# Patient Record
Sex: FEMALE | Race: WHITE | Employment: FULL TIME | URBAN - METROPOLITAN AREA
[De-identification: names, ages, dates, MRNs, and addresses within clinical notes are randomized per-mention and may not be internally consistent; named-entity substitution may affect disease eponyms.]

---

## 2020-07-25 ENCOUNTER — APPOINTMENT (OUTPATIENT)
Dept: GENERAL RADIOLOGY | Age: 50
End: 2020-07-25
Payer: COMMERCIAL

## 2020-07-25 ENCOUNTER — HOSPITAL ENCOUNTER (EMERGENCY)
Age: 50
Discharge: HOME | End: 2020-07-25
Attending: EMERGENCY MEDICINE
Payer: COMMERCIAL

## 2020-07-25 VITALS
WEIGHT: 145 LBS | TEMPERATURE: 99.3 F | RESPIRATION RATE: 16 BRPM | SYSTOLIC BLOOD PRESSURE: 116 MMHG | OXYGEN SATURATION: 96 % | BODY MASS INDEX: 25.69 KG/M2 | DIASTOLIC BLOOD PRESSURE: 84 MMHG | HEIGHT: 63 IN | HEART RATE: 71 BPM

## 2020-07-25 PROCEDURE — 96374 THER/PROPH/DIAG INJ IV PUSH: CPT

## 2020-07-25 PROCEDURE — 73610 X-RAY EXAM OF ANKLE: CPT

## 2020-07-25 PROCEDURE — 99283 EMERGENCY DEPT VISIT LOW MDM: CPT

## 2020-07-25 PROCEDURE — 6370000000 HC RX 637 (ALT 250 FOR IP): Performed by: NURSE PRACTITIONER

## 2020-07-25 PROCEDURE — 6360000002 HC RX W HCPCS: Performed by: NURSE PRACTITIONER

## 2020-07-25 PROCEDURE — 29515 APPLICATION SHORT LEG SPLINT: CPT

## 2020-07-25 PROCEDURE — 96375 TX/PRO/DX INJ NEW DRUG ADDON: CPT

## 2020-07-25 RX ORDER — MORPHINE SULFATE 4 MG/ML
4 INJECTION, SOLUTION INTRAMUSCULAR; INTRAVENOUS ONCE
Status: COMPLETED | OUTPATIENT
Start: 2020-07-25 | End: 2020-07-25

## 2020-07-25 RX ORDER — DIPHENHYDRAMINE HYDROCHLORIDE 50 MG/ML
12.5 INJECTION INTRAMUSCULAR; INTRAVENOUS ONCE
Status: COMPLETED | OUTPATIENT
Start: 2020-07-25 | End: 2020-07-25

## 2020-07-25 RX ORDER — OXYCODONE HYDROCHLORIDE AND ACETAMINOPHEN 5; 325 MG/1; MG/1
2 TABLET ORAL ONCE
Status: COMPLETED | OUTPATIENT
Start: 2020-07-25 | End: 2020-07-25

## 2020-07-25 RX ORDER — MELOXICAM 7.5 MG/1
7.5 TABLET ORAL DAILY
COMMUNITY

## 2020-07-25 RX ORDER — OXYCODONE HYDROCHLORIDE AND ACETAMINOPHEN 5; 325 MG/1; MG/1
1 TABLET ORAL EVERY 6 HOURS PRN
Qty: 10 TABLET | Refills: 0 | Status: SHIPPED | OUTPATIENT
Start: 2020-07-25 | End: 2020-07-28

## 2020-07-25 RX ORDER — ONDANSETRON 2 MG/ML
4 INJECTION INTRAMUSCULAR; INTRAVENOUS ONCE
Status: COMPLETED | OUTPATIENT
Start: 2020-07-25 | End: 2020-07-25

## 2020-07-25 RX ORDER — IBUPROFEN 200 MG
200 TABLET ORAL EVERY 6 HOURS PRN
Status: ON HOLD | COMMUNITY
End: 2020-08-04

## 2020-07-25 RX ADMIN — MORPHINE SULFATE 4 MG: 4 INJECTION INTRAVENOUS at 16:25

## 2020-07-25 RX ADMIN — ONDANSETRON HYDROCHLORIDE 4 MG: 2 INJECTION, SOLUTION INTRAMUSCULAR; INTRAVENOUS at 16:25

## 2020-07-25 RX ADMIN — OXYCODONE HYDROCHLORIDE AND ACETAMINOPHEN 2 TABLET: 5; 325 TABLET ORAL at 17:09

## 2020-07-25 RX ADMIN — DIPHENHYDRAMINE HYDROCHLORIDE 12.5 MG: 50 INJECTION, SOLUTION INTRAMUSCULAR; INTRAVENOUS at 16:59

## 2020-07-25 ASSESSMENT — PAIN SCALES - GENERAL
PAINLEVEL_OUTOF10: 5
PAINLEVEL_OUTOF10: 2

## 2020-07-25 ASSESSMENT — ENCOUNTER SYMPTOMS
SORE THROAT: 0
ABDOMINAL PAIN: 0
SHORTNESS OF BREATH: 0
RHINORRHEA: 0
COLOR CHANGE: 0

## 2020-07-25 ASSESSMENT — PAIN DESCRIPTION - LOCATION
LOCATION: ANKLE

## 2020-07-25 ASSESSMENT — PAIN DESCRIPTION - ORIENTATION
ORIENTATION: RIGHT

## 2020-07-25 NOTE — ED PROVIDER NOTES
I independently performed a history and physical on [facility]. All diagnostic, treatment, and disposition decisions were made by myself in conjunction with the advanced practice provider. For further details of [facility] emergency department encounter, please see Devora Hernandez NP's documentation. Patient reports that she accidentally stepped in a hole while stepping off of her deck and injured her ankle. She had sudden onset of severe pain and was unable to ambulate. On exam the right ankle is swollen and she is neurovascular intact to the toes with 2+ DP pulse. No open component. X-ray shows trimalleolar fracture. Does not appear to need reduction in my opinion with no dislocation. Xr Ankle Right (min 3 Views)    Result Date: 7/25/2020  EXAMINATION: THREE XRAY VIEWS OF THE RIGHT ANKLE 7/25/2020 4:13 pm COMPARISON: None. HISTORY: ORDERING SYSTEM PROVIDED HISTORY: fall TECHNOLOGIST PROVIDED HISTORY: Reason for exam:->fall Reason for Exam: stepped in a hole Acuity: Acute Type of Exam: Initial FINDINGS: Trimalleolar fracture. The ankle mortise maintains approximately near normal alignment. The talar dome is intact. There is associated radiographic soft tissue swelling. Bony mineralization is within normal limits. Trimalleolar fracture with largely preserved ankle mortise.         Hayder Chery MD  07/25/20 9603

## 2020-07-25 NOTE — ED PROVIDER NOTES
Evaluated by Advanced Practice Provider          [facility] ED  [place]        Pt Name: Reena Goss  MRN: [id number]  [place] 1970  Dateof evaluation: 7/25/2020  Provider: LULU Mcwilliams - CNP  PCP: Suri Celestin MD  ED Attending: No att. providers found    [address] [place] [address]       Chief Complaint   Patient presents with    Ankle Pain     stepped in hole c/o right ankle pain and swelling       HISTORY OF PRESENTILLNESS   (Location/Symptom, Timing/Onset, Context/Setting, Quality, Duration, Modifying Factors, Severity)  Note limiting factors. Reena Goss is a 47 y.o. female for right ankle pain. Onset was today. Context includes since the onset. Alleviating factors include pt states she twisted her right ankle in a hole while walking . Aggravating factors include nothing. Pain is 5/10. nothing has been used for pain today. Nursing Notes were all reviewed and agreed with or any disagreements were addressed  in the HPI. REVIEW OF SYSTEMS    (2-9 systems for level 4, 10 or more for level 5)     Review of Systems   Constitutional: Negative for fever. HENT: Negative for congestion, rhinorrhea and sore throat. Respiratory: Negative for shortness of breath. Cardiovascular: Negative for chest pain. Gastrointestinal: Negative for abdominal pain. Genitourinary: Negative for decreased urine volume and difficulty urinating. Musculoskeletal: Negative for arthralgias and myalgias. Right ankle pain    Skin: Negative for color change and rash. Neurological: Negative for dizziness and light-headedness. Psychiatric/Behavioral: Negative for agitation. All other systems reviewed and are negative. Positives and Pertinent negatives as per HPI. Except as noted above in the ROS, all other systems were reviewed and negative.        PAST MEDICAL HISTORY     Past Medical History:   Diagnosis Date    Anxiety     Headache     Thyroid disease          SURGICAL HISTORY     History reviewed. No pertinent surgical history. CURRENT MEDICATIONS       Previous Medications    FLUOXETINE HCL (PROZAC PO)    Take by mouth    IBUPROFEN (ADVIL;MOTRIN) 200 MG TABLET    Take 200 mg by mouth every 6 hours as needed for Pain    LEVOTHYROXINE SODIUM (LEVOTHROID PO)    Take by mouth    MELOXICAM (MOBIC) 7.5 MG TABLET    Take 7.5 mg by mouth daily         ALLERGIES     Patient has no known allergies. FAMILY HISTORY     History reviewed. No pertinent family history.        SOCIAL HISTORY       Social History     Socioeconomic History    Marital status:      Spouse name: None    Number of children: None    Years of education: None    Highest education level: None   Occupational History    None   Social Needs    Financial resource strain: None    Food insecurity     Worry: None     Inability: None    Transportation needs     Medical: None     Non-medical: None   Tobacco Use    Smoking status: Never Smoker    Smokeless tobacco: Never Used   Substance and Sexual Activity    Alcohol use: Yes     Comment: occ    Drug use: Not Currently    Sexual activity: None   Lifestyle    Physical activity     Days per week: None     Minutes per session: None    Stress: None   Relationships    Social connections     Talks on phone: None     Gets together: None     Attends Protestant service: None     Active member of club or organization: None     Attends meetings of clubs or organizations: None     Relationship status: None    Intimate partner violence     Fear of current or ex partner: None     Emotionally abused: None     Physically abused: None     Forced sexual activity: None   Other Topics Concern    None   Social History Narrative    None       SCREENINGS             PHYSICAL EXAM  (up to 7 for level 4, 8 or more for level 5)     ED Triage Vitals   BP Temp Temp Source Pulse Resp SpO2 Height Weight   07/25/20 1603 07/25/20 1603 07/25/20 1603 07/25/20 1603 07/25/20 1657   BP:  (!) 143/79 134/75   Pulse:  64 68   Resp: 12 22 16   Temp:  99.3 °F (37.4 °C)    TempSrc:  Oral    SpO2:  100% 99%   Weight: 145 lb (65.8 kg)     Height: 5' 3\" (1.6 m)         Patient was given the following medications:  Medications   oxyCODONE-acetaminophen (PERCOCET) 5-325 MG per tablet 2 tablet (has no administration in time range)   morphine sulfate (PF) injection 4 mg (4 mg Intravenous Given 7/25/20 1625)   ondansetron (ZOFRAN) injection 4 mg (4 mg Intravenous Given 7/25/20 1625)   diphenhydrAMINE (BENADRYL) injection 12.5 mg (12.5 mg Intravenous Given 7/25/20 1659)       Patient was seen and evaluated by myself and Dr Kenna Rodas. Patient here for right ankle pain. Patient states that she twisted her right ankle while walking in a home. She denies any other injuries. On exam she is awake and alert hemodynamically stable nontoxic in appearance she is neurovascular intact to her right leg. X-rays were obtained and are concerning for a trimalleolar fracture. Patient was provided with a short leg splint and given crutches and was given nonweightbearing instructions. She was medicated with pain medications in the ED. She be given an orthopedic referral and encouraged to follow-up. She was also encouraged to return to the ED for worsening symptoms. Patient was ultimately discharged home with all questions answered. Patient was provided with IV morphine and there was a small amount of erythema noted at the site however she had no trouble breathing or shortness of breath. Patient was given a dose of Benadryl and her symptoms improved. Patient was medicated with Percocet prior to being discharged. The patient tolerated their visit well. I have evaluated thispatient. My supervising physician was available for consultation.  The patient and / or the family were informed of the results of any tests, a time was given to answer questions, a plan was proposed and they agreed withplan. FINAL IMPRESSION      1. Closed trimalleolar fracture of right ankle, initial encounter          DISPOSITION/PLAN   DISPOSITION Decision To Discharge 07/25/2020 04:35:07 PM      PATIENT REFERRED TO:  MD Landon Quevedo [place] [id number]  [phone]    Schedule an appointment as soon as possible for a visit in 2 days  for re-evaluation    [facility] ED  [address] [facility]  [phone]    If symptoms worsen    [address]  [phone]  Schedule an appointment as soon as possible for a visit in 2 days  for re-evaluation      DISCHARGE MEDICATIONS:  New Prescriptions    OXYCODONE-ACETAMINOPHEN (PERCOCET) 5-325 MG PER TABLET    Take 1 tablet by mouth every 6 hours as needed for Pain for up to 3 days. WARNING:  May cause drowsiness. May impair ability to operate vehicles or machinery. Do not use in combination with alcohol.        DISCONTINUED MEDICATIONS:  Discontinued Medications    No medications on file              (Please note that portions of this note were completed with a voice recognition program.  Efforts were made to edit the dictations but occasionally words are mis-transcribed.)    LULU Yo CNP (electronically signed)         LULU Yo CNP  07/25/20 Sagar 1732, APRN - CNP  07/25/20 1702 HTN

## 2020-07-25 NOTE — ED NOTES
Left pedal pulses palpable. Brisk cap refill to right toes, splinted ankle. senation intact in right toes.      Samy Hernandez RN  07/25/20 9832

## 2020-07-25 NOTE — ED NOTES
Pt with 3 hives above iv site. Pt denies itching or sob. Romayne Precise NP at bedside when noticed hives and Benadryl ordered.      Claudia Tinoco RN  07/25/20 3548

## 2020-07-25 NOTE — ED NOTES
duramedic form signed by patient. Pt in wheelchair and out to car. Hives above IV gone prior to d/c'ing IV.      Kimberly Maurer RN  07/25/20 9033

## 2020-07-31 ENCOUNTER — OFFICE VISIT (OUTPATIENT)
Dept: ORTHOPEDIC SURGERY | Age: 50
End: 2020-07-31
Payer: COMMERCIAL

## 2020-07-31 ENCOUNTER — HOSPITAL ENCOUNTER (OUTPATIENT)
Age: 50
Discharge: HOME | End: 2020-07-31
Payer: COMMERCIAL

## 2020-07-31 VITALS — WEIGHT: 145 LBS | BODY MASS INDEX: 25.69 KG/M2 | HEIGHT: 63 IN

## 2020-07-31 PROCEDURE — 99203 OFFICE O/P NEW LOW 30 MIN: CPT | Performed by: ORTHOPAEDIC SURGERY

## 2020-07-31 PROCEDURE — U0003 INFECTIOUS AGENT DETECTION BY NUCLEIC ACID (DNA OR RNA); SEVERE ACUTE RESPIRATORY SYNDROME CORONAVIRUS 2 (SARS-COV-2) (CORONAVIRUS DISEASE [COVID-19]), AMPLIFIED PROBE TECHNIQUE, MAKING USE OF HIGH THROUGHPUT TECHNOLOGIES AS DESCRIBED BY CMS-2020-01-R: HCPCS

## 2020-07-31 NOTE — PROGRESS NOTES
Chief Complaint    Ankle Pain (RIGHT ANKLE FX DOI 7/25)      History of Present Illness:  Chriss Rivera is a 47 y.o. female for evaluation of chief complaint right ankle fracture. This started approximately 6 days ago when she stepped into a hole and twisted her ankle. Symptoms are worse with movement and better with rest, splinting. Patient endorses right ankle but denies numbness tingling. Treatment to date includes: Splint immobilization      Medical History:  Patient's medications, allergies, past medical, surgical, social and family histories were reviewed and updated as appropriate. Review of Systems:  Relevant review of systems reviewed and available in the patient's chart. They are negative or noncontributory except as per HPI. Vital Signs: There were no vitals filed for this visit. General: well-developed, well-nourished  CV: RR  RESP: Respirations unlabored on RA  Skin: No rashes or ulcerations appreciated  Psych: appropriate mood and affect  Musculoskeletal:    Extremity: Right lower extremity    Inspection: Swelling and ecchymosis about the right ankle wrinkling of skin    Palpation: Tender to palpation about the ankle    Range of Motion: Full toe range of motion, ankle range of motion that is    Stability: Not assessed secondary to fracture    Strength: Assessed secondary to fracture    Special Tests: None    Gait: Not assessed secondary to fracture    Pulse/perfusion: 2+ dorsalis pedis pulse foot warm well perfused    Neurological:    Sensation: Sensation intact light touch throughout    Radiology:     X-rays obtained and reviewed in office:  Views AP lateral oblique right ankle  Location right ankle  Impression trimalleolar ankle fracture with small posterior mal fragment. With displacement    Assessment : Right trimalleolar ankle fracture, closed    Impression:  Encounter Diagnosis   Name Primary?     Closed displaced trimalleolar fracture of right ankle, initial encounter Yes Office Procedures:  No orders of the defined types were placed in this encounter. Treatment Plan:      A nice discussion with the patient today. She will do very poorly with nonoperative management so I recommend operative treatment of it. Surgery would be open reduction internal fixation of this fracture. Risk benefits and alternatives were discussed the patient at length. In addition to risks listed below, nonunion, malunion, infection, need for further procedures, failure to relieve all pain, failure to prevent posttraumatic arthritis were all discussed with the patient. She understands these risks and despite them she elects to proceed with surgery. She will continue to be nonweightbearing in her splint and I will see her for surgical treatment at the soonest available time of mutual convenience    I have evaluated the patient myself and completed the examination of the patient on date of visit. Have discussed the case and reviewed all pertinent data with the patient. Risks of surgery include but are not limited to the possibility of;   Infection  Wound healing problems  Nerve Tendon or Vessel Damage  Incomplete pain relief  Need for further surgery in the future  Reflex Sympathetic Dystrophy  DVT or PE (blood clots)  Amputation  Death  No guarantees were given or implied

## 2020-08-03 ENCOUNTER — ANESTHESIA EVENT (OUTPATIENT)
Dept: OPERATING ROOM | Age: 50
End: 2020-08-03
Payer: COMMERCIAL

## 2020-08-03 LAB — SARS-COV-2, NAA: NOT DETECTED

## 2020-08-04 ENCOUNTER — HOSPITAL ENCOUNTER (OUTPATIENT)
Age: 50
Setting detail: OUTPATIENT SURGERY
Discharge: HOME | End: 2020-08-04
Attending: ORTHOPAEDIC SURGERY | Admitting: ORTHOPAEDIC SURGERY
Payer: COMMERCIAL

## 2020-08-04 ENCOUNTER — ANESTHESIA (OUTPATIENT)
Dept: OPERATING ROOM | Age: 50
End: 2020-08-04
Payer: COMMERCIAL

## 2020-08-04 VITALS
WEIGHT: 145 LBS | RESPIRATION RATE: 16 BRPM | HEART RATE: 91 BPM | OXYGEN SATURATION: 98 % | DIASTOLIC BLOOD PRESSURE: 80 MMHG | SYSTOLIC BLOOD PRESSURE: 133 MMHG | BODY MASS INDEX: 25.69 KG/M2 | TEMPERATURE: 98 F | HEIGHT: 63 IN

## 2020-08-04 VITALS
OXYGEN SATURATION: 100 % | TEMPERATURE: 98.6 F | DIASTOLIC BLOOD PRESSURE: 85 MMHG | RESPIRATION RATE: 8 BRPM | SYSTOLIC BLOOD PRESSURE: 123 MMHG

## 2020-08-04 PROCEDURE — 3700000001 HC ADD 15 MINUTES (ANESTHESIA): Performed by: ORTHOPAEDIC SURGERY

## 2020-08-04 PROCEDURE — 2709999900 HC NON-CHARGEABLE SUPPLY: Performed by: ORTHOPAEDIC SURGERY

## 2020-08-04 PROCEDURE — 7100000011 HC PHASE II RECOVERY - ADDTL 15 MIN: Performed by: ORTHOPAEDIC SURGERY

## 2020-08-04 PROCEDURE — 2500000003 HC RX 250 WO HCPCS: Performed by: ORTHOPAEDIC SURGERY

## 2020-08-04 PROCEDURE — 64447 NJX AA&/STRD FEMORAL NRV IMG: CPT | Performed by: ANESTHESIOLOGY

## 2020-08-04 PROCEDURE — 64445 NJX AA&/STRD SCIATIC NRV IMG: CPT | Performed by: ANESTHESIOLOGY

## 2020-08-04 PROCEDURE — 6360000002 HC RX W HCPCS: Performed by: ORTHOPAEDIC SURGERY

## 2020-08-04 PROCEDURE — 2500000003 HC RX 250 WO HCPCS: Performed by: ANESTHESIOLOGY

## 2020-08-04 PROCEDURE — 3600000015 HC SURGERY LEVEL 5 ADDTL 15MIN: Performed by: ORTHOPAEDIC SURGERY

## 2020-08-04 PROCEDURE — 2500000003 HC RX 250 WO HCPCS

## 2020-08-04 PROCEDURE — 7100000010 HC PHASE II RECOVERY - FIRST 15 MIN: Performed by: ORTHOPAEDIC SURGERY

## 2020-08-04 PROCEDURE — 6360000002 HC RX W HCPCS: Performed by: ANESTHESIOLOGY

## 2020-08-04 PROCEDURE — 2580000003 HC RX 258: Performed by: ANESTHESIOLOGY

## 2020-08-04 PROCEDURE — 6360000002 HC RX W HCPCS

## 2020-08-04 PROCEDURE — 7100000000 HC PACU RECOVERY - FIRST 15 MIN: Performed by: ORTHOPAEDIC SURGERY

## 2020-08-04 PROCEDURE — 3600000005 HC SURGERY LEVEL 5 BASE: Performed by: ORTHOPAEDIC SURGERY

## 2020-08-04 PROCEDURE — C1713 ANCHOR/SCREW BN/BN,TIS/BN: HCPCS | Performed by: ORTHOPAEDIC SURGERY

## 2020-08-04 PROCEDURE — 3700000000 HC ANESTHESIA ATTENDED CARE: Performed by: ORTHOPAEDIC SURGERY

## 2020-08-04 PROCEDURE — 2720000010 HC SURG SUPPLY STERILE: Performed by: ORTHOPAEDIC SURGERY

## 2020-08-04 PROCEDURE — 7100000001 HC PACU RECOVERY - ADDTL 15 MIN: Performed by: ORTHOPAEDIC SURGERY

## 2020-08-04 DEVICE — IMPLANTABLE DEVICE
Type: IMPLANTABLE DEVICE | Site: ANKLE | Status: FUNCTIONAL
Brand: ORTHOLOC 3DI PLATING SYSTEM

## 2020-08-04 DEVICE — IMPLANTABLE DEVICE
Type: IMPLANTABLE DEVICE | Site: ANKLE | Status: FUNCTIONAL
Brand: ORTHOLOC

## 2020-08-04 DEVICE — IMPLANTABLE DEVICE
Type: IMPLANTABLE DEVICE | Site: ANKLE | Status: FUNCTIONAL
Brand: ORTHOLOC 3DI

## 2020-08-04 DEVICE — SYSTEM IMPL TI UHMWPE KNOTLESS FOR SYNDESMOSIS FIX: Type: IMPLANTABLE DEVICE | Site: ANKLE | Status: FUNCTIONAL

## 2020-08-04 DEVICE — K-WIRE BLUNT/TROCAR: Type: IMPLANTABLE DEVICE | Site: ANKLE | Status: FUNCTIONAL

## 2020-08-04 RX ORDER — PROPOFOL 10 MG/ML
INJECTION, EMULSION INTRAVENOUS PRN
Status: DISCONTINUED | OUTPATIENT
Start: 2020-08-04 | End: 2020-08-04 | Stop reason: SDUPTHER

## 2020-08-04 RX ORDER — MORPHINE SULFATE 2 MG/ML
2 INJECTION, SOLUTION INTRAMUSCULAR; INTRAVENOUS EVERY 5 MIN PRN
Status: DISCONTINUED | OUTPATIENT
Start: 2020-08-04 | End: 2020-08-04 | Stop reason: HOSPADM

## 2020-08-04 RX ORDER — SODIUM CHLORIDE 0.9 % (FLUSH) 0.9 %
10 SYRINGE (ML) INJECTION EVERY 12 HOURS SCHEDULED
Status: DISCONTINUED | OUTPATIENT
Start: 2020-08-04 | End: 2020-08-04 | Stop reason: HOSPADM

## 2020-08-04 RX ORDER — FENTANYL CITRATE 50 UG/ML
25 INJECTION, SOLUTION INTRAMUSCULAR; INTRAVENOUS
Status: DISCONTINUED | OUTPATIENT
Start: 2020-08-04 | End: 2020-08-04 | Stop reason: HOSPADM

## 2020-08-04 RX ORDER — OXYCODONE HYDROCHLORIDE AND ACETAMINOPHEN 5; 325 MG/1; MG/1
1 TABLET ORAL EVERY 4 HOURS PRN
Qty: 42 TABLET | Refills: 0 | Status: SHIPPED | OUTPATIENT
Start: 2020-08-04 | End: 2020-08-11

## 2020-08-04 RX ORDER — PROPRANOLOL HYDROCHLORIDE 10 MG/1
10 TABLET ORAL 2 TIMES DAILY
COMMUNITY

## 2020-08-04 RX ORDER — CEFAZOLIN SODIUM 1 G/3ML
INJECTION, POWDER, FOR SOLUTION INTRAMUSCULAR; INTRAVENOUS PRN
Status: DISCONTINUED | OUTPATIENT
Start: 2020-08-04 | End: 2020-08-04 | Stop reason: SDUPTHER

## 2020-08-04 RX ORDER — LIDOCAINE HYDROCHLORIDE 20 MG/ML
INJECTION, SOLUTION INFILTRATION; PERINEURAL
Status: COMPLETED
Start: 2020-08-04 | End: 2020-08-04

## 2020-08-04 RX ORDER — ERGOCALCIFEROL 1.25 MG/1
50000 CAPSULE ORAL WEEKLY
Qty: 12 CAPSULE | Refills: 0 | Status: SHIPPED | OUTPATIENT
Start: 2020-08-04

## 2020-08-04 RX ORDER — OXYCODONE HYDROCHLORIDE AND ACETAMINOPHEN 5; 325 MG/1; MG/1
2 TABLET ORAL PRN
Status: DISCONTINUED | OUTPATIENT
Start: 2020-08-04 | End: 2020-08-04 | Stop reason: HOSPADM

## 2020-08-04 RX ORDER — ONDANSETRON 2 MG/ML
INJECTION INTRAMUSCULAR; INTRAVENOUS PRN
Status: DISCONTINUED | OUTPATIENT
Start: 2020-08-04 | End: 2020-08-04 | Stop reason: SDUPTHER

## 2020-08-04 RX ORDER — MIDAZOLAM HYDROCHLORIDE 1 MG/ML
INJECTION INTRAMUSCULAR; INTRAVENOUS PRN
Status: DISCONTINUED | OUTPATIENT
Start: 2020-08-04 | End: 2020-08-04 | Stop reason: SDUPTHER

## 2020-08-04 RX ORDER — SODIUM CHLORIDE, SODIUM LACTATE, POTASSIUM CHLORIDE, CALCIUM CHLORIDE 600; 310; 30; 20 MG/100ML; MG/100ML; MG/100ML; MG/100ML
INJECTION, SOLUTION INTRAVENOUS CONTINUOUS
Status: DISCONTINUED | OUTPATIENT
Start: 2020-08-04 | End: 2020-08-04 | Stop reason: HOSPADM

## 2020-08-04 RX ORDER — BUPIVACAINE HYDROCHLORIDE 5 MG/ML
INJECTION, SOLUTION EPIDURAL; INTRACAUDAL PRN
Status: DISCONTINUED | OUTPATIENT
Start: 2020-08-04 | End: 2020-08-04 | Stop reason: SDUPTHER

## 2020-08-04 RX ORDER — BUPIVACAINE HYDROCHLORIDE 5 MG/ML
INJECTION, SOLUTION EPIDURAL; INTRACAUDAL
Status: DISCONTINUED
Start: 2020-08-04 | End: 2020-08-04 | Stop reason: HOSPADM

## 2020-08-04 RX ORDER — VANCOMYCIN HYDROCHLORIDE 1 G/20ML
INJECTION, POWDER, LYOPHILIZED, FOR SOLUTION INTRAVENOUS PRN
Status: DISCONTINUED | OUTPATIENT
Start: 2020-08-04 | End: 2020-08-04 | Stop reason: ALTCHOICE

## 2020-08-04 RX ORDER — MORPHINE SULFATE 2 MG/ML
1 INJECTION, SOLUTION INTRAMUSCULAR; INTRAVENOUS EVERY 5 MIN PRN
Status: DISCONTINUED | OUTPATIENT
Start: 2020-08-04 | End: 2020-08-04 | Stop reason: HOSPADM

## 2020-08-04 RX ORDER — BUPIVACAINE HYDROCHLORIDE 5 MG/ML
INJECTION, SOLUTION EPIDURAL; INTRACAUDAL PRN
Status: DISCONTINUED | OUTPATIENT
Start: 2020-08-04 | End: 2020-08-04 | Stop reason: ALTCHOICE

## 2020-08-04 RX ORDER — OXYCODONE HYDROCHLORIDE AND ACETAMINOPHEN 5; 325 MG/1; MG/1
1 TABLET ORAL PRN
Status: DISCONTINUED | OUTPATIENT
Start: 2020-08-04 | End: 2020-08-04 | Stop reason: HOSPADM

## 2020-08-04 RX ORDER — ONDANSETRON 2 MG/ML
4 INJECTION INTRAMUSCULAR; INTRAVENOUS
Status: DISCONTINUED | OUTPATIENT
Start: 2020-08-04 | End: 2020-08-04 | Stop reason: HOSPADM

## 2020-08-04 RX ORDER — ASPIRIN 325 MG
325 TABLET, DELAYED RELEASE (ENTERIC COATED) ORAL DAILY
Qty: 30 TABLET | Refills: 0 | Status: SHIPPED | OUTPATIENT
Start: 2020-08-04 | End: 2020-09-03

## 2020-08-04 RX ORDER — KETOROLAC TROMETHAMINE 30 MG/ML
30 INJECTION, SOLUTION INTRAMUSCULAR; INTRAVENOUS ONCE
Status: COMPLETED | OUTPATIENT
Start: 2020-08-04 | End: 2020-08-04

## 2020-08-04 RX ORDER — LIDOCAINE HYDROCHLORIDE 10 MG/ML
INJECTION, SOLUTION EPIDURAL; INFILTRATION; INTRACAUDAL; PERINEURAL
Status: COMPLETED
Start: 2020-08-04 | End: 2020-08-04

## 2020-08-04 RX ORDER — SODIUM CHLORIDE 0.9 % (FLUSH) 0.9 %
10 SYRINGE (ML) INJECTION PRN
Status: DISCONTINUED | OUTPATIENT
Start: 2020-08-04 | End: 2020-08-04 | Stop reason: HOSPADM

## 2020-08-04 RX ORDER — VANCOMYCIN HYDROCHLORIDE 1 G/20ML
INJECTION, POWDER, LYOPHILIZED, FOR SOLUTION INTRAVENOUS
Status: DISCONTINUED
Start: 2020-08-04 | End: 2020-08-04 | Stop reason: HOSPADM

## 2020-08-04 RX ORDER — LIDOCAINE HYDROCHLORIDE 20 MG/ML
INJECTION, SOLUTION EPIDURAL; INFILTRATION; INTRACAUDAL; PERINEURAL
Status: DISCONTINUED
Start: 2020-08-04 | End: 2020-08-04 | Stop reason: HOSPADM

## 2020-08-04 RX ORDER — MIDAZOLAM HYDROCHLORIDE 1 MG/ML
INJECTION INTRAMUSCULAR; INTRAVENOUS
Status: DISCONTINUED
Start: 2020-08-04 | End: 2020-08-04 | Stop reason: HOSPADM

## 2020-08-04 RX ORDER — DEXAMETHASONE SODIUM PHOSPHATE 4 MG/ML
INJECTION, SOLUTION INTRA-ARTICULAR; INTRALESIONAL; INTRAMUSCULAR; INTRAVENOUS; SOFT TISSUE PRN
Status: DISCONTINUED | OUTPATIENT
Start: 2020-08-04 | End: 2020-08-04 | Stop reason: SDUPTHER

## 2020-08-04 RX ORDER — LIDOCAINE HYDROCHLORIDE 20 MG/ML
INJECTION, SOLUTION INFILTRATION; PERINEURAL PRN
Status: DISCONTINUED | OUTPATIENT
Start: 2020-08-04 | End: 2020-08-04 | Stop reason: SDUPTHER

## 2020-08-04 RX ADMIN — HYDROMORPHONE HYDROCHLORIDE 0.5 MG: 1 INJECTION, SOLUTION INTRAMUSCULAR; INTRAVENOUS; SUBCUTANEOUS at 09:45

## 2020-08-04 RX ADMIN — LIDOCAINE HYDROCHLORIDE 0.1 ML: 10 INJECTION, SOLUTION EPIDURAL; INFILTRATION; INTRACAUDAL; PERINEURAL at 06:37

## 2020-08-04 RX ADMIN — CEFAZOLIN 2000 MG: 330 INJECTION, POWDER, FOR SOLUTION INTRAMUSCULAR; INTRAVENOUS at 07:22

## 2020-08-04 RX ADMIN — HYDROMORPHONE HYDROCHLORIDE 0.5 MG: 1 INJECTION, SOLUTION INTRAMUSCULAR; INTRAVENOUS; SUBCUTANEOUS at 09:53

## 2020-08-04 RX ADMIN — ONDANSETRON 4 MG: 2 INJECTION, SOLUTION INTRAMUSCULAR; INTRAVENOUS at 07:22

## 2020-08-04 RX ADMIN — DEXAMETHASONE SODIUM PHOSPHATE 8 MG: 4 INJECTION, SOLUTION INTRAMUSCULAR; INTRAVENOUS at 07:22

## 2020-08-04 RX ADMIN — KETOROLAC TROMETHAMINE 30 MG: 30 INJECTION, SOLUTION INTRAMUSCULAR at 09:36

## 2020-08-04 RX ADMIN — BUPIVACAINE HYDROCHLORIDE 15 ML: 5 INJECTION, SOLUTION EPIDURAL; INTRACAUDAL; PERINEURAL at 06:59

## 2020-08-04 RX ADMIN — LIDOCAINE HYDROCHLORIDE 60 MG: 20 INJECTION, SOLUTION INFILTRATION; PERINEURAL at 07:22

## 2020-08-04 RX ADMIN — SODIUM CHLORIDE, POTASSIUM CHLORIDE, SODIUM LACTATE AND CALCIUM CHLORIDE: 600; 310; 30; 20 INJECTION, SOLUTION INTRAVENOUS at 06:38

## 2020-08-04 RX ADMIN — BUPIVACAINE HYDROCHLORIDE 15 ML: 5 INJECTION, SOLUTION EPIDURAL; INTRACAUDAL; PERINEURAL at 06:54

## 2020-08-04 RX ADMIN — FENTANYL CITRATE 25 MCG: 50 INJECTION, SOLUTION INTRAMUSCULAR; INTRAVENOUS at 10:21

## 2020-08-04 RX ADMIN — MIDAZOLAM 2 MG: 1 INJECTION INTRAMUSCULAR; INTRAVENOUS at 06:50

## 2020-08-04 RX ADMIN — FAMOTIDINE 20 MG: 10 INJECTION, SOLUTION INTRAVENOUS at 06:38

## 2020-08-04 RX ADMIN — HYDROMORPHONE HYDROCHLORIDE 0.5 MG: 1 INJECTION, SOLUTION INTRAMUSCULAR; INTRAVENOUS; SUBCUTANEOUS at 10:09

## 2020-08-04 RX ADMIN — HYDROMORPHONE HYDROCHLORIDE 0.5 MG: 1 INJECTION, SOLUTION INTRAMUSCULAR; INTRAVENOUS; SUBCUTANEOUS at 09:37

## 2020-08-04 RX ADMIN — PROPOFOL 200 MG: 10 INJECTION, EMULSION INTRAVENOUS at 07:22

## 2020-08-04 ASSESSMENT — PULMONARY FUNCTION TESTS
PIF_VALUE: 7
PIF_VALUE: 5
PIF_VALUE: 5
PIF_VALUE: 8
PIF_VALUE: 5
PIF_VALUE: 5
PIF_VALUE: 13
PIF_VALUE: 2
PIF_VALUE: 5
PIF_VALUE: 8
PIF_VALUE: 8
PIF_VALUE: 2
PIF_VALUE: 7
PIF_VALUE: 7
PIF_VALUE: 5
PIF_VALUE: 1
PIF_VALUE: 5
PIF_VALUE: 5
PIF_VALUE: 4
PIF_VALUE: 2
PIF_VALUE: 3
PIF_VALUE: 1
PIF_VALUE: 5
PIF_VALUE: 4
PIF_VALUE: 9
PIF_VALUE: 1
PIF_VALUE: 4
PIF_VALUE: 5
PIF_VALUE: 5
PIF_VALUE: 8
PIF_VALUE: 6
PIF_VALUE: 5
PIF_VALUE: 1
PIF_VALUE: 3
PIF_VALUE: 12
PIF_VALUE: 2
PIF_VALUE: 2
PIF_VALUE: 3
PIF_VALUE: 7
PIF_VALUE: 4
PIF_VALUE: 8
PIF_VALUE: 7
PIF_VALUE: 2
PIF_VALUE: 6
PIF_VALUE: 3
PIF_VALUE: 5
PIF_VALUE: 2
PIF_VALUE: 8
PIF_VALUE: 2
PIF_VALUE: 5
PIF_VALUE: 7
PIF_VALUE: 2
PIF_VALUE: 5
PIF_VALUE: 2
PIF_VALUE: 5
PIF_VALUE: 2
PIF_VALUE: 8
PIF_VALUE: 7
PIF_VALUE: 5
PIF_VALUE: 17
PIF_VALUE: 8
PIF_VALUE: 5
PIF_VALUE: 2
PIF_VALUE: 13
PIF_VALUE: 7
PIF_VALUE: 5
PIF_VALUE: 8
PIF_VALUE: 0
PIF_VALUE: 8
PIF_VALUE: 2
PIF_VALUE: 7
PIF_VALUE: 14
PIF_VALUE: 2
PIF_VALUE: 2
PIF_VALUE: 7
PIF_VALUE: 2
PIF_VALUE: 8
PIF_VALUE: 5
PIF_VALUE: 14
PIF_VALUE: 2
PIF_VALUE: 0
PIF_VALUE: 4
PIF_VALUE: 0
PIF_VALUE: 2
PIF_VALUE: 5
PIF_VALUE: 5
PIF_VALUE: 0
PIF_VALUE: 2
PIF_VALUE: 7
PIF_VALUE: 3
PIF_VALUE: 2
PIF_VALUE: 7
PIF_VALUE: 3
PIF_VALUE: 5
PIF_VALUE: 6
PIF_VALUE: 2
PIF_VALUE: 5
PIF_VALUE: 8
PIF_VALUE: 2
PIF_VALUE: 2
PIF_VALUE: 4
PIF_VALUE: 8
PIF_VALUE: 8
PIF_VALUE: 7
PIF_VALUE: 2
PIF_VALUE: 8
PIF_VALUE: 2
PIF_VALUE: 5
PIF_VALUE: 1
PIF_VALUE: 5
PIF_VALUE: 8
PIF_VALUE: 5
PIF_VALUE: 0
PIF_VALUE: 7

## 2020-08-04 ASSESSMENT — PAIN DESCRIPTION - ORIENTATION
ORIENTATION: RIGHT

## 2020-08-04 ASSESSMENT — PAIN SCALES - GENERAL
PAINLEVEL_OUTOF10: 10
PAINLEVEL_OUTOF10: 4
PAINLEVEL_OUTOF10: 10
PAINLEVEL_OUTOF10: 10

## 2020-08-04 ASSESSMENT — PAIN DESCRIPTION - LOCATION
LOCATION: ANKLE

## 2020-08-04 ASSESSMENT — LIFESTYLE VARIABLES: SMOKING_STATUS: 0

## 2020-08-04 ASSESSMENT — ENCOUNTER SYMPTOMS: SHORTNESS OF BREATH: 0

## 2020-08-04 NOTE — ANESTHESIA POSTPROCEDURE EVALUATION
Department of Anesthesiology  Postprocedure Note    Patient: Jerry Bernard  MRN: [id number]  YOB: 1970  Date of evaluation: 8/4/2020  Time:  10:42 AM     Procedure Summary     Date:  08/04/20 Room / Location:  [place] [address] / [facility]    Anesthesia Start:  8171 Anesthesia Stop:  3497    Procedure:  OPEN REDUCTION INTERNAL FIXATION RIGHT ANKLE   (Right Ankle) Diagnosis:  (CLOSED DISPLACED TRIMALLEOLAR FRACTURE-RIGHT)    Surgeon:  Alva Rosado MD Responsible Provider:  Los Andre MD    Anesthesia Type:  general ASA Status:  2          Anesthesia Type: general    Schuyler Phase I: Schuyler Score: 10    Cshuyler Phase II: Schuyler Score: 10    Last vitals: Reviewed and per EMR flowsheets.    Vitals:    08/04/20 0945 08/04/20 1000 08/04/20 1015 08/04/20 1028   BP: (!) 155/92 (!) 143/89 125/82    Pulse: 81 71 77 93   Resp: 16 16 16 13   Temp:    98 °F (36.7 °C)   TempSrc:       SpO2: 100% 100% 99% 99%   Weight:       Height:           Anesthesia Post Evaluation    Patient location during evaluation: bedside  Patient participation: complete - patient participated  Level of consciousness: awake and alert  Airway patency: patent  Nausea & Vomiting: no nausea  Complications: no  Cardiovascular status: hemodynamically stable  Respiratory status: acceptable  Hydration status: euvolemic

## 2020-08-04 NOTE — PROGRESS NOTES
Admitted to PACU. Respirations easy and regular. Right foot elevated with ice. Right toes warm and pink with good cap refill.

## 2020-08-04 NOTE — ANESTHESIA PROCEDURE NOTES
Peripheral Block    Patient location during procedure: pre-op  Staffing  Anesthesiologist: Arsenio Delgado MD  Performed: anesthesiologist   Preanesthetic Checklist  Completed: patient identified, site marked, pre-op evaluation, timeout performed, IV checked, risks and benefits discussed, monitors and equipment checked, anesthesia consent given, oxygen available and patient being monitored  Peripheral Block  Patient position: supine  Prep: ChloraPrep  Patient monitoring: continuous pulse ox, frequent blood pressure checks and IV access  Block type: Femoral  Laterality: right  Injection technique: single-shot  Procedures: ultrasound guided  Local infiltration: lidocaine  Infiltration strength: 2 %  Dose: 2 mL  Adductor canal  Local infiltration: lidocaine  Needle  Needle type: combined needle/nerve stimulator   Needle gauge: 21 G  Needle length: 10 cm  Needle localization: anatomical landmarks and ultrasound guidance  Assessment  Injection assessment: negative aspiration for heme, no paresthesia on injection and local visualized surrounding nerve on ultrasound  Slow fractionated injection: yes  Hemodynamics: stable  Additional Notes  CONSENT, TIME OUT, IV SED ALL COMPLETE WITH PREV BLK., U/S LOCALIZATION, LIDO SKIN AND DEEP, NEEDLE, GOOD LA SPREAD, MULT NEG ASPS, PIC TAKEN, PT FREDY WELL!   Reason for block: post-op pain management and at surgeon's request

## 2020-08-04 NOTE — BRIEF OP NOTE
Brief Postoperative Note      Patient: Dilcia Lewis  YOB: 1970  MRN: [id number]    Date of Procedure: 8/4/2020    Pre-Op Diagnosis: CLOSED DISPLACED TRIMALLEOLAR FRACTURE-RIGHT    Post-Op Diagnosis: Same       Procedure(s):  Open treatment trimalleolar ankle fracture Right CPT 18808  Open treatment syndesmotic disruption right CPT 71829      Surgeon(s):  Abdirashid Champagne MD    Assistant:  Surgical Assistant: Ruiz Caldera    Anesthesia: General    Estimated Blood Loss (mL): less than 50     Complications: None    Specimens:   * No specimens in log *    Implants:  * No implants in log *      Drains: * No LDAs found *    Findings: Trimalleolar ankle fracture with instability after fixation of medial and lateral fractures with stress exam. Stable to stress exam after placement of tightrope across reduced syndesmosis.      Electronically signed by Abdirashid Champagne MD on 8/4/2020 at 9:09 AM

## 2020-08-04 NOTE — ANESTHESIA PROCEDURE NOTES
Peripheral Block    Patient location during procedure: pre-op  Staffing  Anesthesiologist: Lexus Jett MD  Performed: anesthesiologist   Preanesthetic Checklist  Completed: patient identified, site marked, pre-op evaluation, timeout performed, IV checked, risks and benefits discussed, monitors and equipment checked, anesthesia consent given, oxygen available and patient being monitored  Peripheral Block  Patient position: left lateral decubitus  Prep: ChloraPrep  Patient monitoring: continuous pulse ox, frequent blood pressure checks and IV access  Block type: Sciatic  Laterality: right  Injection technique: single-shot  Procedures: ultrasound guided  Local infiltration: lidocaine  Infiltration strength: 2 %  Dose: 2 mL  Popliteal  Provider prep: mask and sterile gloves  Local infiltration: lidocaine  Needle  Needle type: combined needle/nerve stimulator   Needle gauge: 21 G  Needle length: 10 cm  Needle localization: anatomical landmarks and ultrasound guidance  Assessment  Injection assessment: negative aspiration for heme, no paresthesia on injection and local visualized surrounding nerve on ultrasound  Slow fractionated injection: yes  Hemodynamics: stable  Additional Notes  CONSENT, TIME OUT, IV SED, PREP, U/S LOCALIZATION, LIDO SKIN AND DEEP,  NEEDLE, GOOD LA SPREAD, MULT NEG ASPS, PIC TAKEN, PT FREDY WELL!   Reason for block: post-op pain management and at surgeon's request

## 2020-08-04 NOTE — ANESTHESIA PRE PROCEDURE
Department of Anesthesiology  Preprocedure Note       Name:  Kunal Medrano   Age:  47 y.o.  :  1970                                          MRN:  [id number]         Date:  2020      Surgeon: Bozena Chaidez):  Bud Garcia MD    Procedure: Procedure(s):  OPEN REDUCTION INTERNAL FIXATION RIGHT ANKLE  -BLOCK    Medications prior to admission:   Prior to Admission medications    Medication Sig Start Date End Date Taking? Authorizing Provider   propranolol (INDERAL) 10 MG tablet Take 10 mg by mouth 2 times daily   Yes Historical Provider, MD   meloxicam (MOBIC) 7.5 MG tablet Take 7.5 mg by mouth daily    Historical Provider, MD   Levothyroxine Sodium (LEVOTHROID PO) Take by mouth    Historical Provider, MD   FLUoxetine HCl (PROZAC PO) Take by mouth    Historical Provider, MD       Current medications:    Current Facility-Administered Medications   Medication Dose Route Frequency Provider Last Rate Last Dose    midazolam (VERSED) 2 MG/2ML injection             lidocaine 2 % injection             lactated ringers infusion   Intravenous Continuous Hector Adames MD        sodium chloride flush 0.9 % injection 10 mL  10 mL Intravenous 2 times per day Hector Adames MD        sodium chloride flush 0.9 % injection 10 mL  10 mL Intravenous PRN Hector Adames MD        famotidine (PEPCID) injection 20 mg  20 mg Intravenous Once Hector Adames MD        ceFAZolin (ANCEF) 2 g in sterile water 20 mL IV syringe  2 g Intravenous Once Bud Garcia MD        lidocaine 1 % (PF) injection 0.1 mL  0.1 mL Intradermal Once Hector Adames MD        lidocaine PF 1 % injection             cefazolin 2 g in 20 mL SWFI IV Syringe IV syringe                Allergies: Allergies   Allergen Reactions    Morphine Hives    Zofran [Ondansetron Hcl] Hives       Problem List:  There is no problem list on file for this patient.       Past Medical History:        Diagnosis Evaluation  Patient summary reviewed no history of anesthetic complications:   Airway: Mallampati: II  TM distance: >3 FB   Neck ROM: full  Mouth opening: > = 3 FB Dental:          Pulmonary:Negative Pulmonary ROS breath sounds clear to auscultation      (-) COPD, asthma, shortness of breath, sleep apnea and not a current smoker          Patient did not smoke on day of surgery. Cardiovascular:Negative CV ROS        (-) pacemaker, hypertension, past MI, CAD, CABG/stent, dysrhythmias,  angina and  CHF      Rhythm: regular  Rate: normal           Beta Blocker:  Not on Beta Blocker         Neuro/Psych:   (+) headaches:,    (-) seizures, TIA, CVA and depression/anxiety            GI/Hepatic/Renal: Neg GI/Hepatic/Renal ROS       (-) GERD, liver disease, no renal disease and no morbid obesity       Endo/Other:    (+) hypothyroidism::., no malignancy/cancer. (-) diabetes mellitus, blood dyscrasia, no malignancy/cancer               Abdominal:           Vascular: negative vascular ROS. Anesthesia Plan      general     ASA 2     (Preop blks for pop pain man., consented)  Induction: intravenous. MIPS: Postoperative opioids intended and Prophylactic antiemetics administered. Anesthetic plan and risks discussed with patient. Plan discussed with CRNA. This pre-anesthesia assessment may be used as a history and physical.    DOS STAFF ADDENDUM:    Pt seen and examined, chart reviewed (including anesthesia, drug and allergy history). No interval changes to history and physical examination. Anesthetic plan, risks, benefits, alternatives, and personnel involved discussed with patient. Patient verbalized an understanding and agrees to proceed.       Jos Florentino MD  August 4, 2020  6:36 AM          Jos Florentino MD   8/4/2020

## 2020-08-04 NOTE — H&P
Kurtis Godinez MD    Physician    Specialty:  Orthopedic Surgery    Progress Notes    Signed    Encounter Date:  7/31/2020          Related encounter: Office Visit from 7/31/2020 in [facility]          Signed              Show:Clear all  [x]Manual[x]Template[]Copied    Added by:  Marcelle Green MD    []Hover for details  Chief Complaint    Ankle Pain (RIGHT ANKLE FX DOI 7/25)       History of Present Illness:  Shy Hopson is a 47 y.o. female for evaluation of chief complaint right ankle fracture. This started approximately 6 days ago when she stepped into a hole and twisted her ankle. Symptoms are worse with movement and better with rest, splinting. Patient endorses right ankle but denies numbness tingling. Treatment to date includes: Splint immobilization        Medical History:  Patient's medications, allergies, past medical, surgical, social and family histories were reviewed and updated as appropriate.     Review of Systems:  Relevant review of systems reviewed and available in the patient's chart. They are negative or noncontributory except as per HPI.     Vital Signs:   There were no vitals filed for this visit.     General: well-developed, well-nourished  CV: RR  RESP: Respirations unlabored on RA  Skin: No rashes or ulcerations appreciated  Psych: appropriate mood and affect  Musculoskeletal:     Extremity: Right lower extremity     Inspection: Swelling and ecchymosis about the right ankle wrinkling of skin     Palpation: Tender to palpation about the ankle     Range of Motion: Full toe range of motion, ankle range of motion that is     Stability: Not assessed secondary to fracture     Strength: Assessed secondary to fracture     Special Tests: None     Gait: Not assessed secondary to fracture     Pulse/perfusion: 2+ dorsalis pedis pulse foot warm well perfused     Neurological:     Sensation: Sensation intact light touch throughout     Radiology:     X-rays obtained and reviewed in office:  Views AP lateral oblique right ankle  Location right ankle  Impression trimalleolar ankle fracture with small posterior mal fragment. With displacement     Assessment : Right trimalleolar ankle fracture, closed     Impression:       Encounter Diagnosis   Name Primary?  Closed displaced trimalleolar fracture of right ankle, initial encounter Yes        Office Procedures:  No orders of the defined types were placed in this encounter.       Treatment Plan:       A nice discussion with the patient today. She will do very poorly with nonoperative management so I recommend operative treatment of it. Surgery would be open reduction internal fixation of this fracture. Risk benefits and alternatives were discussed the patient at length. In addition to risks listed below, nonunion, malunion, infection, need for further procedures, failure to relieve all pain, failure to prevent posttraumatic arthritis were all discussed with the patient. She understands these risks and despite them she elects to proceed with surgery. She will continue to be nonweightbearing in her splint and I will see her for surgical treatment at the soonest available time of mutual convenience     I have evaluated the patient myself and completed the examination of the patient on date of visit. Have discussed the case and reviewed all pertinent data with the patient. Risks of surgery include but are not limited to the possibility of; Infection  Wound healing problems  Nerve Tendon or Vessel Damage  Incomplete pain relief  Need for further surgery in the future  Reflex Sympathetic Dystrophy  DVT or PE (blood clots)  Amputation  Death  No guarantees were given or implied                                         Addendum: H and P reviewed and updated. No changes.  Proceed with ORIF R trimal.

## 2020-08-04 NOTE — OP NOTE
Operative Note      Patient: Shana Price  YOB: 1970  MRN: [id number]    Date of Procedure: 8/4/2020    Pre-Op Diagnosis: CLOSED DISPLACED TRIMALLEOLAR FRACTURE-RIGHT    Post-Op Diagnosis: Same       Procedure(s):  Open treatment of trimalleolar ankle fracture right - CPT 80628  Open treatment of syndesmotic disruption right - CPT 76029    Surgeon(s):  Salvador Zimmerman MD    Assistant:   Surgical Assistant: Ryan Burnett    Anesthesia: General    Estimated Blood Loss (mL): less than 50     Complications: None    Tourniquet time 59 minutes    Specimens:   * No specimens in log *    Implants:  * No implants in log [employer] distal fibular plate with 3.5 screws. 2.4 leg screws x2 [employer].  4.0 cannulated screws x2 right medical.  Tight rope x1, Arthrex      Drains: * No LDAs found *    Findings: Open reduction and surgical fixation of medial moderate lateral malleoli fractures. Indirect reduction of posterior malleolus to this. Persistent instability after fixation under stress exam so syndesmosis reduced and fixed with tight rope. Indications for procedure: This is a 50-year-old female who sustained a trimalleolar unstable ankle fracture. This was indicated for surgery given the known poor outcomes of nonoperative treatment. Currently wrist benefits and alternatives were discussed with the patient and her  and they like to proceed with operative treatment of this ankle fracture. Risks include infection nonunion malunion need for further procedures failure to relieve pain failure to prevent development of arthritis, need for further procedures, PE, DVT, death. Detailed Description of Procedure: On the day of surgery the patient was identified in the preoperative holding area. History and physical exam was reviewed and updated. Operative site was marked with the assistance of the patient. The surgical consent form was verified.   Patient was then given a regional block by the anesthesia team.  Patient was then transferred back to the operating suite where they were positioned supine on the operative table with all bony prominences well-padded and the right hip bumped. Antibiotics were given at the appropriate time. A thigh tourniquet was placed onto the right lower extremity and the right lower extremity was prepped and draped in the usual sterile fashion. A surgical timeout was performed. The operative limb was exsanguinated and the tourniquet was inflated to 250 mmHg. A lateral approach to the fibula was performed. Dissection was taken down through the deeper fascia bluntly with Metzenbaum scissors to protect any neurovascular structures specifically superficial peroneal nerve. Fracture was identified and debrided. Fracture was then reduced anatomically. A 2.4 lag screw was then placed using lag by technique across the fracture. A second neutralization 2 4 screw was then also placed across the fracture. The clamps removed and anatomic reduction was maintained. A [facility] anatomic distal fibular plate was then positioned appropriately across the fracture and fixated proximally distally using the appropriate length screws. The reduction and plate positioning and length of screws were verified using biplanar fluoroscopy. I then turned my attention to the medial malleolus. An incision was made over the medial malleolus curving anteriorly just distal to the tip of it. Blunt dissection was taken down to protect any saphenous structures on the side. The periosteum was removed from the fracture site and the fracture was debrided. A 2.0 mm drill was then used to make a hole for pointed reduction clamp positioning proximal to the fracture. Pointed reduction clamp and a dental pick were then used to anatomically reduce this.   The reduction anteriorly about the joint was verified to be anatomic.  2 guidewires for cannulated screws were then placed across the fracture site. The position of these was verified using biplanar fluoroscopy. A cannulated drill was used for 4.0 partially-threaded cannulated screws, followed by placement of 48 mm 4.0 partially-threaded cannulated screws. These provided excellent compression and were tightened 1 after the other 2 prevent toggling. The clamp was then removed and the reduction at the joint verified once again over the top of the incision. I then verified the fracture reduction on AP mortise and lateral views. Performed an external rotation stress examination under fluoroscopy and found that there was still instability at the syndesmosis. Under fluoroscopic guidance I then placed an Arthrex tight rope parallel to the ankle joint across the syndesmosis and tightened this down verifying that the syndesmotic reduction was appropriate. I then did a repeat stress examination and found the ankle to be stable with a symmetrically reduced ankle mortise under the mortise view. Final radiographs including AP mortise and lateral views were then taken to make sure that there was maintenance of reduction appropriate screw lengths. Satisfied with the reduction stability of a center my attention to closure. The tourniquet was let down and the surgical wounds were copiously irrigated. 10 mL of 0.5% bupivacaine were then used to perform an adjunctive saphenous block. Vancomycin powder was placed in the medial and lateral wounds. Skin was closed with buried interrupted 2-0 Monocryl sutures and staples. Sterile dressing consisting of Xeroform 4 x 4's and ABD pads followed by cast padding and Ace wrap were then placed over the ankle. The leg was then placed into a postoperative boot and the patient awoken from general anesthesia. Postoperative plan:  The patient will be nonweightbearing for 6 weeks. I will see her back in 2 weeks for repeat radiographs and wound check.   She will take aspirin for a total of 30 days postoperatively for DVT prophylaxis. She should remain in the boot at all times until such time as I see her back and loosen these restrictions.       electronically signed by La Nena Prasad MD on 8/4/2020 at 9:14 AM

## 2020-08-05 ENCOUNTER — TELEPHONE (OUTPATIENT)
Dept: ORTHOPEDIC SURGERY | Age: 50
End: 2020-08-05

## 2020-08-05 NOTE — TELEPHONE ENCOUNTER
called stating the patient is still having quite  Bit of pain and is taking pain meds every 2 hours.

## 2020-08-17 ENCOUNTER — OFFICE VISIT (OUTPATIENT)
Dept: ORTHOPEDIC SURGERY | Age: 50
End: 2020-08-17

## 2020-08-17 VITALS — HEIGHT: 63 IN | WEIGHT: 145 LBS | BODY MASS INDEX: 25.69 KG/M2

## 2020-08-17 PROBLEM — S82.851D CLOSED DISPLACED TRIMALLEOLAR FRACTURE OF RIGHT ANKLE WITH ROUTINE HEALING: Status: ACTIVE | Noted: 2020-08-17

## 2020-08-17 PROCEDURE — 99024 POSTOP FOLLOW-UP VISIT: CPT | Performed by: ORTHOPAEDIC SURGERY

## 2020-08-17 NOTE — PROGRESS NOTES
Subjective: Patient is here for 2-week post-op after open reduction to fixation of right ankle trimalleolar fracture    Objective: Physical exam shows well-healed incisions and neurovascular intact right ankle with minimal to    Imaging: Nonweightbearing AP lateral oblique right ankle which show anatomic reduction and surgical fixation of right trimalleolar ankle fracture. Assessment and plan: Ms. Tawanda Rowley is doing absolutely fantastic for this point after surgery. Her incisions are healing well she has no numbness and no signs of infection. Like her to continue be nonweightbearing for another 4 weeks. Should stay in the boot for 24 hours a day for the another 2 weeks at which point she is no longer asked to sleep in it. Would like her to see PT to help her with gentle range of motion exercises mostly just in line dorsiflexion plantarflexion to help her keep some motion. May shower but not soak the incision. Okay to come out of the boot a couple times a day for gentle ankle range of motion.   See her back in 4 weeks at which point we will get radiographs and likely advance her to weightbearing as tolerated in the boot

## 2020-08-18 ENCOUNTER — TELEPHONE (OUTPATIENT)
Dept: ORTHOPEDIC SURGERY | Age: 50
End: 2020-08-18

## 2020-08-19 ENCOUNTER — TELEPHONE (OUTPATIENT)
Dept: ORTHOPEDIC SURGERY | Age: 50
End: 2020-08-19

## 2020-08-24 ENCOUNTER — HOSPITAL ENCOUNTER (OUTPATIENT)
Dept: PHYSICAL THERAPY | Age: 50
Setting detail: THERAPIES SERIES
Discharge: HOME | End: 2020-08-24
Payer: COMMERCIAL

## 2020-08-24 PROCEDURE — 97140 MANUAL THERAPY 1/> REGIONS: CPT | Performed by: PHYSICAL THERAPIST

## 2020-08-24 PROCEDURE — 97161 PT EVAL LOW COMPLEX 20 MIN: CPT | Performed by: PHYSICAL THERAPIST

## 2020-08-24 PROCEDURE — 97110 THERAPEUTIC EXERCISES: CPT | Performed by: PHYSICAL THERAPIST

## 2020-08-24 NOTE — PLAN OF CARE
Benjamin [facility]                                                       Physical Therapy Certification    Dear Referring Practitioner: Pressley Nageotte,    We had the pleasure of evaluating the following patient for physical therapy services at [address] [place] [address]. A summary of our findings can be found in the initial assessment below. This includes our plan of care. If you have any questions or concerns regarding these findings, please do not hesitate to contact me at the office phone number checked above. Thank you for the referral.       Physician Signature:_______________________________Date:__________________  By signing above (or electronic signature), therapists plan is approved by physician    Patient: Cally Pugh   : 1970   MRN: [id number]  Referring Physician: Referring Practitioner: Pressley Nageotte      Evaluation Date: 2020      Medical Diagnosis Information:  Diagnosis: R ankle ORIF (Trimalleolar fx) 20   Treatment Diagnosis: S82.851D                                         Insurance information: PT Insurance Information: BCBS     Precautions/ Contra-indications/Relevant Medical History: NWB at this time. DF/PF \"gentle ROM\"    C-SSRS Triggered by Intake questionnaire (Past 2 wk assessment):   [x] No, Questionnaire did not trigger screening.   [] Yes, Patient intake triggered further evaluation      [] C-SSRS Screening completed  [] PCP notified via Plan of Care  [] Emergency services notified     Latex Allergy:  [x]NO      []YES  Preferred Language for Healthcare:   [x]English       []other:    SUBJECTIVE: Patient stated complaint: patient is s/p R ankle ORIF (trimalleolar fx) on 2020 by Dr. Pressley Nageotte.        Functional Disability Index: LEFS: 100% (Score: 0/80)    Pain Scale: 3-4/10  Easing factors: rest, boot use,   Provocative factors: bed mobility, car mobility, getting dressed,      Type: [x]Constant   []Intermittent []Radiating []Localized []other:     Numbness/Tingling: \"some tingling at times\"    Occupation/School: Factory / warehouse type work    Living Status/Prior Level of Function: Independent with ADLs and IADLs    OBJECTIVE:     ROM LEFT RIGHT   HIP Flex     HIP Abd     HIP Ext     HIP IR     HIP ER     Knee ext     Knee Flex     Ankle DF 10 °  Lacking 20 °    Ankle PF 45 °  30 °    Ankle In 33 °  NT   Ankle Ev 18 °  NT   Strength  LEFT RIGHT   HIP Flexors WNL ? NT ? HIP Abductors     HIP Ext     Hip ER     Knee EXT (quad)     Knee Flex (HS)     Ankle DF     Ankle PF     Ankle Inv     Ankle EV            Reflexes/Sensation:    []Dermatomes/Myotomes intact    []Reflexes equal and normal bilaterally   []Other:    Joint mobility:    [x]Normal    []Hypo   []Hyper    Palpation/Observation: Incisions covered with steri strips at this time. Functional Mobility/Transfers: NWB in \"roll a bout\"    Posture: WNL    Bandages/Dressings/Incisions: covered in steri strips. Gait: (include devices/WB status) NWB    Orthopedic Special Tests: NT                       [x] Patient history, allergies, meds reviewed. Medical chart reviewed. See intake form. Review Of Systems (ROS):  [x]Performed Review of systems (Integumentary, CardioPulmonary, Neurological) by intake and observation. Intake form has been scanned into medical record. Patient has been instructed to contact their primary care physician regarding ROS issues if not already being addressed at this time.       Co-morbidities/Complexities (which will affect course of rehabilitation):   []None           Arthritic conditions   []Rheumatoid arthritis (M05.9)  []Osteoarthritis (M19.91)   Cardiovascular conditions   []Hypertension (I10)  []Hyperlipidemia (E78.5)  []Angina pectoris (I20)  []Atherosclerosis (I70)   Musculoskeletal conditions   []Disc pathology   []Congenital spine pathologies   []Prior surgical intervention  []Osteoporosis (M81.8)  []Osteopenia (M85.8) Endocrine conditions   []Hypothyroid (E03.9)  []Hyperthyroid Gastrointestinal conditions   []Constipation (R28.86)   Metabolic conditions   []Morbid obesity (E66.01)  []Diabetes type 1(E10.65) or 2 (E11.65)   []Neuropathy (G60.9)     Pulmonary conditions   []Asthma (J45)  []Coughing   []COPD (J44.9)   Psychological Disorders  []Anxiety (F41.9)  []Depression (F32.9)   []Other:   []Other:          Barriers to/and or personal factors that will affect rehab potential:              []Age  []Sex              []Motivation/Lack of Motivation                        []Co-Morbidities              []Cognitive Function, education/learning barriers              []Environmental, home barriers              []profession/work barriers  []past PT/medical experience  []other:  Justification:     Falls Risk Assessment (30 days):   [x] Falls Risk assessed and no intervention required.   [] Falls Risk assessed and Patient requires intervention due to being higher risk   TUG score (>12s at risk):     [] Falls education provided      G-Codes:       ASSESSMENT:   Functional Impairments:     []Noted lumbar/proximal hip/LE joint hypomobility   [x]Decreased LE functional ROM   [x]Decreased core/proximal hip strength and neuromuscular control   [x]Decreased LE functional strength   [x]Reduced balance/proprioceptive control   []other:      Functional Activity Limitations (from functional questionnaire and intake)   []Reduced ability to tolerate prolonged functional positions   []Reduced ability or difficulty with changes of positions or transfers between positions   [x]Reduced ability to maintain good posture and demonstrate good body mechanics with sitting, bending, and lifting   [x]Reduced ability to sleep   [x] Reduced ability or tolerance with driving and/or computer work   [x]Reduced ability to perform lifting, carrying tasks   [x]Reduced ability to squat   [x]Reduced ability to forward bend   [x]Reduced ability to ambulate prolonged functional periods/distances/surfaces   [x]Reduced ability to ascend/descend stairs   [x]Reduced ability to run, hop, cut or jump   []other:    Participation Restrictions   [x]Reduced participation in self care activities   [x]Reduced participation in home management activities   [x]Reduced participation in work activities   [x]Reduced participation in social activities. [x]Reduced participation in sport/recreation activities. Classification :     [x]Signs/symptoms consistent with post-surgical status including decreased ROM, strength and function.    []Signs/symptoms consistent with joint sprain/strain   []Signs/symptoms consistent with patella-femoral syndrome   []Signs/symptoms consistent with knee OA/hip OA   []Signs/symptoms consistent with internal derangement of knee/Hip   []Signs/symptoms consistent with functional hip weakness/NMR control      []Signs/symptoms consistent with tendinitis/tendinosis    []signs/symptoms consistent with pathology which may benefit from Dry needling      []other:      Prognosis/Rehab Potential:      []Excellent   [x]Good    []Fair   []Poor    Tolerance of evaluation/treatment:    []Excellent   [x]Good    []Fair   []Poor    Physical Therapy Evaluation Complexity Justification   [x] A history of present problem with:  [] no personal factors and/or comorbidities that impact the plan of care;  [x]1-2 personal factors and/or comorbidities that impact the plan of care  []3 personal factors and/or comorbidities that impact the plan of care  [x] An examination of body systems using standardized tests and measures addressing any of the following: body structures and functions (impairments), activity limitations, and/or participation restrictions;:  [] a total of 1-2 or more elements   [x] a total of 3 or more elements   [] a total of 4 or more elements   [x] A clinical presentation with:  [x] stable and/or uncomplicated characteristics   [] evolving clinical presentation with changing characteristics  [] unstable and unpredictable characteristics;   [x] Clinical decision making of [x] low, [] moderate, [] high complexity using standardized patient assessment instrument and/or measurable assessment of functional outcome. [x] EVAL (LOW) 67241 (typically 20 minutes face-to-face)  [] EVAL (MOD) 25813 (typically 30 minutes face-to-face)  [] EVAL (HIGH) 93294 (typically 45 minutes face-to-face)  [] RE-EVAL     PLAN  Frequency/Duration:  1-2 days per week for 12 weeks:  Interventions:  [x]  Therapeutic exercise including: strength training, ROM, for Lower extremity and core   [x]  NMR activation and proprioception for LE, Glutes and Core   [x]  Manual therapy as indicated for LE, Hip and spine to include: Dry Needling/IASTM, STM, PROM, Gr I-IV mobilizations, manipulation. [x] Modalities as needed that may include: thermal agents, E-stim, Biofeedback, US, iontophoresis as indicated  [x] Patient education on joint protection, postural re-education, activity modification, progression of HEP. HEP instruction: (see scanned forms)    GOALS:    Short Term Goals: To be achieved in: 2 weeks  1. Independent in HEP and progression per patient tolerance, in order to prevent re-injury. [] Progressing: [] Met: [] Not Met: [] Adjusted   2. Patient will have a decrease in pain to facilitate improvement in movement, function, and ADLs as indicated by Functional Deficits. [] Progressing: [] Met: [] Not Met: [] Adjusted     Long Term Goals: To be achieved in: 12 weeks  1. Disability index score of 20% or less for the LEFS to assist with reaching prior level of function. [] Progressing: [] Met: [] Not Met: [] Adjusted   2. Patient will demonstrate increased AROM to equal the opposite side bilaterally to allow for proper joint functioning as indicated by patients Functional Deficits. [] Progressing: [] Met: [] Not Met: [] Adjusted   3.  Patient will demonstrate an increase in strength to be within 3lbs on the HHD or match bilaterally to allow for proper functional mobility as indicated by patients Functional Deficits. [] Progressing: [] Met: [] Not Met: [] Adjusted   4. Patient will return to all transfers, work activities, and functional activities without increased symptoms or restriction. [] Progressing: [] Met: [] Not Met: [] Adjusted   5. Patient will have 0/10 pain with ADL's.  [] Progressing: [] Met: [] Not Met: [] Adjusted   6. Patient stated goal: Return to work with no limitations w/ no AD.    [] Progressing: [] Met: [] Not Met: [] Adjusted     Electronically signed by:  Alva Cochran PT

## 2020-08-24 NOTE — FLOWSHEET NOTE
\[facility]\""    Physical Therapy Treatment Note/ Progress Report:     Date:  2020    Patient Name:  Dilcia Lewis    :  1970  MRN: [id number]  Restrictions/Precautions:    Medical/Treatment Diagnosis Information:  · Diagnosis: R ankle ORIF (Trimalleolar fx) 20  · Treatment Diagnosis: V54.228U  Insurance/Certification information:  PT Insurance Information: [facility]  Physician Information:  Referring Practitioner: Pardeep Dumont  Has the plan of care been signed (Y/N):        []  Yes  [x]  No     Date of Patient follow up with Physician: 2020    Is this a Progress Report:     []  Yes  [x]  No      If Yes:  Date Range for reporting period:  Initial Eval: 2020  Beginnin2020 --- Endin2020    Progress report will be due (10 Rx or 30 days whichever is less):      Recertification will be due (POC Duration  / 90 days whichever is less): 2020      Visit # Insurance Allowable Auth Required   In Person 1 20 []  Yes     []  No    Tele Health 0  []  Yes     []  No    Total 1       Functional Scale: LEFS: 100% (Score: 0/80)   Date assessed: 2020      Latex Allergy:  [x]NO      []YES  Preferred Language for Healthcare:   [x]English       []other:    Pain level:  3-4/10     SUBJECTIVE:  See eval    OBJECTIVE: See eval   Observation:    Test measurements:      RESTRICTIONS/PRECAUTIONS:     NWB - \"Like her to continue be nonweightbearing for another 4 weeks. Should stay in the boot for 24 hours a day for the another 2 weeks at which point she is no longer asked to sleep in it. (2020).  - Dr. Chavez Class ROM in DF/PF planes only at this time. - Dr. Pardeep Dumont    Exercises/Interventions:   Therapeutic Ex (23894)  Therapeutic Activity (94399)  NMR re-education (77176) Sets/Reps Notes/CUES   Bike                    Long sitting gastroc / soleus 3x30\" ea    Long sitting HSS 3x30\"    Ankle pumps x20    Ankle iso DF/PF          Seated HR / TR x20 ea    Seated toe proprioception of core, proximal hip and LE for self-care, mobility, lifting, and ambulation/stair navigation      Manual Treatments:  PROM / STM / Oscillations-Mobs:  G-I, II, III, IV (PA's, Inf., Post.)  [x] (23101) Provided manual therapy to mobilize LE, proximal hip and/or LS spine soft tissue/joints for the purpose of modulating pain, promoting relaxation, increasing ROM, reducing/eliminating soft tissue swelling/inflammation/restriction, improving soft tissue extensibility and allowing for proper ROM for normal function with self-care, mobility, lifting and ambulation. Modalities:      Charges:  Timed Code Treatment Minutes: 40   Total Treatment Minutes:  60   BWC:  TE TIME:  NMR TIME:  MANUAL TIME:  UNTIMED MINUTES:               [x] EVAL (LOW) 15629 (typically 20 minutes face-to-face)  [] EVAL (MOD) 05562 (typically 30 minutes face-to-face)  [] EVAL (HIGH) 19134 (typically 45 minutes face-to-face)  [] RE-EVAL     [x] QC(88484) x 2    [] IONTO  [] NMR (27719) x     [] VASO  [x] Manual (30093) x 1     [] Other:  [] TA x      [] Mech Traction (20100)  [] ES(attended) (61808)      [] ES (un) (82004):    ASSESSMENT:  See eval    GOALS:   Short Term Goals: To be achieved in: 2 weeks  1. Independent in HEP and progression per patient tolerance, in order to prevent re-injury. []? Progressing: []? Met: []? Not Met: []? Adjusted       2. Patient will have a decrease in pain to facilitate improvement in movement, function, and ADLs as indicated by Functional Deficits. []? Progressing: []? Met: []? Not Met: []? Adjusted          Long Term Goals: To be achieved in: 12 weeks  1. Disability index score of 20% or less for the LEFS to assist with reaching prior level of function. []? Progressing: []? Met: []? Not Met: []? Adjusted      2. Patient will demonstrate increased AROM to equal the opposite side bilaterally to allow for proper joint functioning as indicated by patients Functional Deficits.    []?

## 2020-08-27 ENCOUNTER — TELEPHONE (OUTPATIENT)
Dept: ORTHOPEDIC SURGERY | Age: 50
End: 2020-08-27

## 2020-08-28 ENCOUNTER — HOSPITAL ENCOUNTER (OUTPATIENT)
Dept: PHYSICAL THERAPY | Age: 50
Setting detail: THERAPIES SERIES
Discharge: HOME | End: 2020-08-28
Payer: COMMERCIAL

## 2020-08-28 PROCEDURE — 97110 THERAPEUTIC EXERCISES: CPT

## 2020-08-28 PROCEDURE — 97140 MANUAL THERAPY 1/> REGIONS: CPT

## 2020-08-28 NOTE — FLOWSHEET NOTE
[facility] [place]    Physical Therapy Treatment Note/ Progress Report:     Date:  2020    Patient Name:  Elana Headings    :  1970  MRN: [id number]  Restrictions/Precautions:    Medical/Treatment Diagnosis Information:  · Diagnosis: R ankle ORIF (Trimalleolar fx) 20  · Treatment Diagnosis: L24.592H  Insurance/Certification information:  PT Insurance Information: [facility]  Physician Information:  Referring Practitioner: Regino Pichardo  Has the plan of care been signed (Y/N):        [x]  Yes  []  No     Date of Patient follow up with Physician: 2020    Is this a Progress Report:     []  Yes  [x]  No      If Yes:  Date Range for reporting period:  Initial Eval: 2020  Beginnin2020 --- Endin2020    Progress report will be due (10 Rx or 30 days whichever is less):      Recertification will be due (POC Duration  / 90 days whichever is less): 2020      Visit # Insurance Allowable Auth Required   In Person 2 20 []  Yes     []  No    Tele Health 0  []  Yes     []  No    Total 2       Functional Scale: LEFS: 100% (Score: 0/80)   Date assessed: 2020      Latex Allergy:  [x]NO      []YES  Preferred Language for Healthcare:   [x]English       []other:    Pain level:  3-4/10     SUBJECTIVE:  Reports ankle kind of rubs in her boot    OBJECTIVE: See eval   Observation:    Test measurements:      RESTRICTIONS/PRECAUTIONS:     NWB - \"Like her to continue be nonweightbearing for another 4 weeks. Should stay in the boot for 24 hours a day for the another 2 weeks at which point she is no longer asked to sleep in it. (2020).  - Dr. Edmund Reynoso ROM in DF/PF planes only at this time. - Dr. Regino Pichardo    Exercises/Interventions:   Therapeutic Ex (74873)  Therapeutic Activity (99405)  NMR re-education (27431) Sets/Reps Notes/CUES   Bike                    Long sitting gastroc / soleus 3x30\" ea    Long sitting HSS 3x30\"    Ankle pumps x20    Ankle iso DF/PF     Quad sets 2x10    Glut sets 2x10    Hip/knee flex  2x10 Modified heel slide without dragging heel   Seated HR / TR x20 ea    Seated toe crunches x20 ea    Seated LAQ w/adduct rex 2x10                                                              Manual Intervention (88659)     PROM / STM 10 min DF/ PF range only                                                Patient Education 5 min Provided biomechanics/ergonomics training to reduce stress across injured/healing structures. Weight bearing education. Provided Education in post-operative precautions/contraindications. Therapeutic Exercise and NMR EXR  [x] (36069) Provided verbal/tactile cueing for activities related to strengthening, flexibility, endurance, ROM for improvements in LE, proximal hip, and core control with self care, mobility, lifting, ambulation. [x] (20207) Provided verbal/tactile cueing for activities related to improving balance, coordination, kinesthetic sense, posture, motor skill, proprioception to assist with LE, proximal hip, and core control in self-care, mobility, lifting, ambulation and eccentric single leg control. NMR and Therapeutic Activities:    [x] (17458 or 66690) Provided verbal/tactile cueing for activities related to improving balance, coordination, kinesthetic sense, posture, motor skill, proprioception and motor activation to allow for proper function of core, proximal hip and LE with self-care and ADLs and functional mobility.    [x] (65688) Gait Re-education- Provided training and instruction to the patient for proper LE, core and proximal hip recruitment and positioning and eccentric body weight control with ambulation re-education including up and down stairs     Home Exercise Program:    [x] (71342) Reviewed/Progressed HEP activities related to strengthening, flexibility, endurance, ROM of core, proximal hip and LE for functional self-care, mobility, lifting and ambulation/stair navigation   [x] (55567) Reviewed/Progressed HEP activities related to improving balance, coordination, kinesthetic sense, posture, motor skill, proprioception of core, proximal hip and LE for self-care, mobility, lifting, and ambulation/stair navigation      Manual Treatments:  PROM / STM / Oscillations-Mobs:  G-I, II, III, IV (PA's, Inf., Post.)  [x] (26216) Provided manual therapy to mobilize LE, proximal hip and/or LS spine soft tissue/joints for the purpose of modulating pain, promoting relaxation, increasing ROM, reducing/eliminating soft tissue swelling/inflammation/restriction, improving soft tissue extensibility and allowing for proper ROM for normal function with self-care, mobility, lifting and ambulation. Modalities:      Charges:  Timed Code Treatment Minutes: 30   Total Treatment Minutes:  30   BWC:  TE TIME:  NMR TIME:  MANUAL TIME:  UNTIMED MINUTES:               [x] EVAL (LOW) 18043 (typically 20 minutes face-to-face)  [] EVAL (MOD) 19207 (typically 30 minutes face-to-face)  [] EVAL (HIGH) 26001 (typically 45 minutes face-to-face)  [] RE-EVAL     [x] JK(98481) x 2    [] IONTO  [] NMR (39557) x     [] VASO  [x] Manual (37824) x 1     [] Other:  [] TA x      [] Mech Traction (56764)  [] ES(attended) (28439)      [] ES (un) (91462):    ASSESSMENT:  See eval    GOALS:   Short Term Goals: To be achieved in: 2 weeks  1. Independent in HEP and progression per patient tolerance, in order to prevent re-injury. []? Progressing: []? Met: []? Not Met: []? Adjusted       2. Patient will have a decrease in pain to facilitate improvement in movement, function, and ADLs as indicated by Functional Deficits. []? Progressing: []? Met: []? Not Met: []? Adjusted          Long Term Goals: To be achieved in: 12 weeks  1. Disability index score of 20% or less for the LEFS to assist with reaching prior level of function. []? Progressing: []? Met: []? Not Met: []? Adjusted      2.  Patient will demonstrate increased AROM to equal the opposite side bilaterally to allow for proper joint functioning as indicated by patients Functional Deficits. []? Progressing: []? Met: []? Not Met: []? Adjusted       3. Patient will demonstrate an increase in strength to be within 3lbs on the HHD or match bilaterally to allow for proper functional mobility as indicated by patients Functional Deficits. []? Progressing: []? Met: []? Not Met: []? Adjusted       4. Patient will return to all transfers, work activities, and functional activities without increased symptoms or restriction. []? Progressing: []? Met: []? Not Met: []? Adjusted       5. Patient will have 0/10 pain with ADL's.  []? Progressing: []? Met: []? Not Met: []? Adjusted       6. Patient stated goal: Return to work with no limitations w/ no AD.   []? Progressing: []? Met: []? Not Met: []? Adjusted       Overall Progression Towards Functional goals/ Treatment Progress Update:  [] Patient is progressing as expected towards functional goals listed. [] Progression is slowed due to complexities/Impairments listed. [] Progression has been slowed due to co-morbidities.   [x] Plan just implemented, too soon to assess goals progression <30days   [] Goals require adjustment due to lack of progress  [] Patient is not progressing as expected and requires additional follow up with physician  [] Other    Prognosis for POC: [x] Good [] Fair  [] Poor    Patient requires continued skilled intervention: [x] Yes  [] No    Treatment/Activity Tolerance:  [x] Patient able to complete treatment  [] Patient limited by fatigue  [] Patient limited by pain    [] Patient limited by other medical complications  [] Other:     Return to Play: (if applicable)   []  Stage 1: Intro to Strength   []  Stage 2: Return to Run and Strength   []  Stage 3: Return to Jump and Strength   []  Stage 4: Dynamic Strength and Agility   []  Stage 5: Sport Specific Training     []  Ready to Return to Play, Meets All Above Stages   []  Not Ready for Return to Sports   Comments:                         PLAN: See eval  [x] Continue per plan of care [] Alter current plan (see comments above)  [] Plan of care initiated [] Hold pending MD visit [] Discharge    Electronically signed by:  Ulices Nieves [id number]    Note: If patient does not return for scheduled/ recommended follow up visits, this note will serve as a discharge from care along with most recent update on progress.

## 2020-09-01 ENCOUNTER — HOSPITAL ENCOUNTER (OUTPATIENT)
Dept: PHYSICAL THERAPY | Age: 50
Setting detail: THERAPIES SERIES
Discharge: HOME | End: 2020-09-01
Payer: COMMERCIAL

## 2020-09-01 PROCEDURE — 97110 THERAPEUTIC EXERCISES: CPT

## 2020-09-01 PROCEDURE — 97140 MANUAL THERAPY 1/> REGIONS: CPT

## 2020-09-01 NOTE — FLOWSHEET NOTE
Glut sets 2x10    Hip/knee flex  2x10 Modified heel slide without dragging heel   SLR flex, ext, abd 2x10    Prone hams curl 2x10         Seated HR / TR x20 ea    Seated toe crunches x20 ea    Seated LAQ w/adduct rex 2x10                                                              Manual Intervention (70481)     PROM / STM 10 min DF/ PF range only                                                Patient Education  min Provided biomechanics/ergonomics training to reduce stress across injured/healing structures. Weight bearing education. Provided Education in post-operative precautions/contraindications. Therapeutic Exercise and NMR EXR  [x] (87050) Provided verbal/tactile cueing for activities related to strengthening, flexibility, endurance, ROM for improvements in LE, proximal hip, and core control with self care, mobility, lifting, ambulation. [x] (98504) Provided verbal/tactile cueing for activities related to improving balance, coordination, kinesthetic sense, posture, motor skill, proprioception to assist with LE, proximal hip, and core control in self-care, mobility, lifting, ambulation and eccentric single leg control. NMR and Therapeutic Activities:    [x] (00000 or 33722) Provided verbal/tactile cueing for activities related to improving balance, coordination, kinesthetic sense, posture, motor skill, proprioception and motor activation to allow for proper function of core, proximal hip and LE with self-care and ADLs and functional mobility.    [x] (89423) Gait Re-education- Provided training and instruction to the patient for proper LE, core and proximal hip recruitment and positioning and eccentric body weight control with ambulation re-education including up and down stairs     Home Exercise Program:    [x] (37846) Reviewed/Progressed HEP activities related to strengthening, flexibility, endurance, ROM of core, proximal hip and LE for functional self-care, mobility, lifting and ambulation/stair navigation   [x] (52113) Reviewed/Progressed HEP activities related to improving balance, coordination, kinesthetic sense, posture, motor skill, proprioception of core, proximal hip and LE for self-care, mobility, lifting, and ambulation/stair navigation      Manual Treatments:  PROM / STM / Oscillations-Mobs:  G-I, II, III, IV (PA's, Inf., Post.)  [x] (64121) Provided manual therapy to mobilize LE, proximal hip and/or LS spine soft tissue/joints for the purpose of modulating pain, promoting relaxation, increasing ROM, reducing/eliminating soft tissue swelling/inflammation/restriction, improving soft tissue extensibility and allowing for proper ROM for normal function with self-care, mobility, lifting and ambulation. Modalities:      Charges:  Timed Code Treatment Minutes: 38   Total Treatment Minutes:  38   BWC:  TE TIME:  NMR TIME:  MANUAL TIME:  UNTIMED MINUTES:               [x] EVAL (LOW) 45445 (typically 20 minutes face-to-face)  [] EVAL (MOD) 26836 (typically 30 minutes face-to-face)  [] EVAL (HIGH) 16401 (typically 45 minutes face-to-face)  [] RE-EVAL     [x] CM(21093) x 2    [] IONTO  [] NMR (00438) x     [] VASO  [x] Manual (04143) x 1     [] Other:  [] TA x      [] Mech Traction (22230)  [] ES(attended) (89788)      [] ES (un) (15615):    ASSESSMENT:  See eval    GOALS:   Short Term Goals: To be achieved in: 2 weeks  1. Independent in HEP and progression per patient tolerance, in order to prevent re-injury. []? Progressing: []? Met: []? Not Met: []? Adjusted       2. Patient will have a decrease in pain to facilitate improvement in movement, function, and ADLs as indicated by Functional Deficits. []? Progressing: []? Met: []? Not Met: []? Adjusted          Long Term Goals: To be achieved in: 12 weeks  1. Disability index score of 20% or less for the LEFS to assist with reaching prior level of function. []? Progressing: []? Met: []? Not Met: []? Adjusted      2.  Patient will Play, Meets All Above Stages   []  Not Ready for Return to Sports   Comments:                         PLAN: See eval  [x] Continue per plan of care [] Alter current plan (see comments above)  [] Plan of care initiated [] Hold pending MD visit [] Discharge    Electronically signed by:  Kimi Arevalo [id number]    Note: If patient does not return for scheduled/ recommended follow up visits, this note will serve as a discharge from care along with most recent update on progress.

## 2020-09-04 ENCOUNTER — HOSPITAL ENCOUNTER (OUTPATIENT)
Dept: PHYSICAL THERAPY | Age: 50
Setting detail: THERAPIES SERIES
Discharge: HOME | End: 2020-09-04
Payer: COMMERCIAL

## 2020-09-04 PROCEDURE — 97110 THERAPEUTIC EXERCISES: CPT

## 2020-09-04 PROCEDURE — 97140 MANUAL THERAPY 1/> REGIONS: CPT

## 2020-09-04 NOTE — FLOWSHEET NOTE
\[facility]\""    Physical Therapy Treatment Note/ Progress Report:     Date:  2020    Patient Name:  Sara Garcai    :  1970  MRN: [id number]  Restrictions/Precautions:    Medical/Treatment Diagnosis Information:  · Diagnosis: R ankle ORIF (Trimalleolar fx) 20  · Treatment Diagnosis: T49.252E  Insurance/Certification information:  PT Insurance Information: [facility]  Physician Information:  Referring Practitioner: Abilio Drake  Has the plan of care been signed (Y/N):        [x]  Yes  []  No     Date of Patient follow up with Physician: 2020    Is this a Progress Report:     []  Yes  [x]  No      If Yes:  Date Range for reporting period:  Initial Eval: 2020  Beginnin2020 --- Endin2020    Progress report will be due (10 Rx or 30 days whichever is less):      Recertification will be due (POC Duration  / 90 days whichever is less): 2020      Visit # Insurance Allowable Auth Required   In Person 4 20 []  Yes     []  No    Tele Health 0  []  Yes     []  No    Total 4        Functional Scale: LEFS: 100% (Score: 0/80)   Date assessed: 2020      Latex Allergy:  [x]NO      []YES  Preferred Language for Healthcare:   [x]English       [x]other:    Pain level:  2-4/10     SUBJECTIVE:  Reports pain not quite as bad    OBJECTIVE: See eval   Observation:    Test measurements:      RESTRICTIONS/PRECAUTIONS:     NWB - \"Like her to continue be nonweightbearing for another 4 weeks. Should stay in the boot for 24 hours a day for the another 2 weeks at which point she is no longer asked to sleep in it. (2020).  - Dr. Rony Parra ROM in DF/PF planes only at this time. - Dr. Abilio Drake    Exercises/Interventions:   Therapeutic Ex (32869)  Therapeutic Activity (81905)  NMR re-education (00396) Sets/Reps Notes/CUES   Bike                    Long sitting gastroc / soleus 3x30\" ea    Long sitting HSS 3x30\"    Ankle pumps x20    Ankle iso DF/PF x10 ea    Quad sets 2x10 Glut sets 2x10    Hip/knee flex  2x10 Modified heel slide without dragging heel   SLR flex, ext, abd 1#  2x10    Prone hams curl 4# 2x10         Seated HR / TR x20 ea    Seated toe crunches x20 ea    Seated LAQ w/adduct rex 4#   2x10    Plantar flx with yellow- lightly 2x10              At home pt will leave her boot on for extra weight with PREs                                                          Manual Intervention ([id number])     PROM / STM/ 1st and 5th ray mobiliz, calc mobiliz   25 min DF/ PF range only                                                Patient Education  min Provided biomechanics/ergonomics training to reduce stress across injured/healing structures. Weight bearing education. Provided Education in post-operative precautions/contraindications. Therapeutic Exercise and NMR EXR:  15 min  [x] (66105) Provided verbal/tactile cueing for activities related to strengthening, flexibility, endurance, ROM for improvements in LE, proximal hip, and core control with self care, mobility, lifting, ambulation. [x] (02112) Provided verbal/tactile cueing for activities related to improving balance, coordination, kinesthetic sense, posture, motor skill, proprioception to assist with LE, proximal hip, and core control in self-care, mobility, lifting, ambulation and eccentric single leg control. NMR and Therapeutic Activities:    [x] (16105 or 30700) Provided verbal/tactile cueing for activities related to improving balance, coordination, kinesthetic sense, posture, motor skill, proprioception and motor activation to allow for proper function of core, proximal hip and LE with self-care and ADLs and functional mobility.    [x] (73600) Gait Re-education- Provided training and instruction to the patient for proper LE, core and proximal hip recruitment and positioning and eccentric body weight control with ambulation re-education including up and down stairs     Home Exercise Program:    [x] (77170) Reviewed/Progressed HEP activities related to strengthening, flexibility, endurance, ROM of core, proximal hip and LE for functional self-care, mobility, lifting and ambulation/stair navigation   [x] (93638) Reviewed/Progressed HEP activities related to improving balance, coordination, kinesthetic sense, posture, motor skill, proprioception of core, proximal hip and LE for self-care, mobility, lifting, and ambulation/stair navigation      Manual Treatments:  PROM / STM / Oscillations-Mobs:  G-I, II, III, IV (PA's, Inf., Post.)  [x] (96665) Provided manual therapy to mobilize LE, proximal hip and/or LS spine soft tissue/joints for the purpose of modulating pain, promoting relaxation, increasing ROM, reducing/eliminating soft tissue swelling/inflammation/restriction, improving soft tissue extensibility and allowing for proper ROM for normal function with self-care, mobility, lifting and ambulation. Modalities:      Charges:  Timed Code Treatment Minutes:  40   Total Treatment Minutes:   40   BWC:  TE TIME:  NMR TIME:  MANUAL TIME:  UNTIMED MINUTES:               [] EVAL (LOW) 88368 (typically 20 minutes face-to-face)  [] EVAL (MOD) 79609 (typically 30 minutes face-to-face)  [] EVAL (HIGH) 11620 (typically 45 minutes face-to-face)  [] RE-EVAL     [x] WS(79868) x 1    [] IONTO  [] NMR (12172) x     [] VASO  [x] Manual (30049) x 2     [] Other:  [] TA x      [] Mech Traction (86775)  [] ES(attended) (51206)      [] ES (un) (64244):    ASSESSMENT:   9/4/20 improved AROM after rx.  0-2 ' DF    GOALS:   Short Term Goals: To be achieved in: 2 weeks  1. Independent in HEP and progression per patient tolerance, in order to prevent re-injury. []? Progressing: [x]? Met: []? Not Met: []? Adjusted       2. Patient will have a decrease in pain to facilitate improvement in movement, function, and ADLs as indicated by Functional Deficits. []? Progressing: [x]? Met: []? Not Met: []? Adjusted          Long Term Goals:  To be achieved in: 12 weeks  1. Disability index score of 20% or less for the LEFS to assist with reaching prior level of function. []? Progressing: []? Met: []? Not Met: []? Adjusted      2. Patient will demonstrate increased AROM to equal the opposite side bilaterally to allow for proper joint functioning as indicated by patients Functional Deficits. []? Progressing: []? Met: []? Not Met: []? Adjusted       3. Patient will demonstrate an increase in strength to be within 3lbs on the HHD or match bilaterally to allow for proper functional mobility as indicated by patients Functional Deficits. []? Progressing: []? Met: []? Not Met: []? Adjusted       4. Patient will return to all transfers, work activities, and functional activities without increased symptoms or restriction. []? Progressing: []? Met: []? Not Met: []? Adjusted       5. Patient will have 0/10 pain with ADL's.  []? Progressing: []? Met: []? Not Met: []? Adjusted       6. Patient stated goal: Return to work with no limitations w/ no AD.   []? Progressing: []? Met: []? Not Met: []? Adjusted       Overall Progression Towards Functional goals/ Treatment Progress Update:  [x] Patient is progressing as expected towards functional goals listed. [] Progression is slowed due to complexities/Impairments listed. [] Progression has been slowed due to co-morbidities.   [] Plan just implemented, too soon to assess goals progression <30days   [] Goals require adjustment due to lack of progress  [] Patient is not progressing as expected and requires additional follow up with physician  [] Other    Prognosis for POC: [x] Good [] Fair  [] Poor    Patient requires continued skilled intervention: [x] Yes  [] No    Treatment/Activity Tolerance:  [x] Patient able to complete treatment  [] Patient limited by fatigue  [] Patient limited by pain    [] Patient limited by other medical complications  [] Other:     Return to Play: (if applicable)   []  Stage 1: Intro to Strength   []  Stage 2: Return to Run and Strength   []  Stage 3: Return to Jump and Strength   []  Stage 4: Dynamic Strength and Agility   []  Stage 5: Sport Specific Training     []  Ready to Return to Play, Meets All Above Stages   []  Not Ready for Return to Sports   Comments:                         PLAN: See eval  [x] Continue per plan of care [] Alter current plan (see comments above)  [] Plan of care initiated [] Hold pending MD visit [] Discharge    Electronically signed by:  Debra Bustos, [address] [place] [address]    Note: If patient does not return for scheduled/ recommended follow up visits, this note will serve as a discharge from care along with most recent update on progress.

## 2020-09-08 ENCOUNTER — HOSPITAL ENCOUNTER (OUTPATIENT)
Dept: PHYSICAL THERAPY | Age: 50
Setting detail: THERAPIES SERIES
Discharge: HOME | End: 2020-09-08
Payer: COMMERCIAL

## 2020-09-08 PROCEDURE — 97140 MANUAL THERAPY 1/> REGIONS: CPT

## 2020-09-08 PROCEDURE — 97110 THERAPEUTIC EXERCISES: CPT

## 2020-09-08 NOTE — FLOWSHEET NOTE
[facility]    Physical Therapy Treatment Note/ Progress Report:     Date:  2020    Patient Name:  Orlando Khoury    :  1970  MRN: [id number]  Restrictions/Precautions:    Medical/Treatment Diagnosis Information:  · Diagnosis: R ankle ORIF (Trimalleolar fx) 20  · Treatment Diagnosis: Z76.970Q  Insurance/Certification information:  PT Insurance Information: [employer]  Physician Information:  Referring Practitioner: Amanda Cardozo  Has the plan of care been signed (Y/N):        [x]  Yes  []  No      Date of Patient follow up with Physician: 2020    Is this a Progress Report:     []  Yes  [x]  No      If Yes:  Date Range for reporting period:  Initial Eval: 2020  Beginnin2020 --- Endin2020    Progress report will be due (10 Rx or 30 days whichever is less): 1747     Recertification will be due (POC Duration  / 90 days whichever is less): 2020      Visit # Insurance Allowable Auth Required   In Person 5 20 []  Yes     []  No    Tele Health 0  []  Yes     []  No    Total 5        Functional Scale: LEFS: 100% (Score: 0/80)   Date assessed: 2020      Latex Allergy:  [x]NO      []YES  Preferred Language for Healthcare:   [x]English       [x]other:    Pain level:  1-3/10     SUBJECTIVE:  Reports pain is improving, steristrips have all come off    OBJECTIVE: See eval   Observation:    Test measurements:      RESTRICTIONS/PRECAUTIONS:     NWB - \"Like her to continue be nonweightbearing for another 4 weeks. Should stay in the boot for 24 hours a day for the another 2 weeks at which point she is no longer asked to sleep in it. (2020).  - Dr. Haleigh Jade ROM in DF/PF planes only at this time. - Dr. Amanda Cardozo    Exercises/Interventions:   Therapeutic Ex (80726)  Therapeutic Activity (88253)  NMR re-education (99193) Sets/Reps Notes/CUES   Bike                    Long sitting gastroc / soleus 3x30\" ea    Long sitting HSS 3x30\"    Ankle pumps x20    Ankle iso re-education including up and down stairs     Home Exercise Program:    [x] (13585) Reviewed/Progressed HEP activities related to strengthening, flexibility, endurance, ROM of core, proximal hip and LE for functional self-care, mobility, lifting and ambulation/stair navigation   [x] (91611) Reviewed/Progressed HEP activities related to improving balance, coordination, kinesthetic sense, posture, motor skill, proprioception of core, proximal hip and LE for self-care, mobility, lifting, and ambulation/stair navigation      Manual Treatments:  PROM / STM / Oscillations-Mobs:  G-I, II, III, IV (PA's, Inf., Post.)  [x] (52862) Provided manual therapy to mobilize LE, proximal hip and/or LS spine soft tissue/joints for the purpose of modulating pain, promoting relaxation, increasing ROM, reducing/eliminating soft tissue swelling/inflammation/restriction, improving soft tissue extensibility and allowing for proper ROM for normal function with self-care, mobility, lifting and ambulation. Modalities:      Charges:  Timed Code Treatment Minutes:  38   Total Treatment Minutes:   38   BWC:  TE TIME:  NMR TIME:  MANUAL TIME:  UNTIMED MINUTES:               [] EVAL (LOW) 80967 (typically 20 minutes face-to-face)  [] EVAL (MOD) 46237 (typically 30 minutes face-to-face)  [] EVAL (HIGH) 49629 (typically 45 minutes face-to-face)  [] RE-EVAL     [x] WM(02981) x 1    [] IONTO  [] NMR (11096) x     [] VASO  [x] Manual (49921) x 2     [] Other:  [] TA x      [] Mech Traction (17539)  [] ES(attended) (63660)      [] ES (un) (08631):    ASSESSMENT:   9/4/20 improved AROM after rx.  0-2 ' DF    GOALS:   Short Term Goals: To be achieved in: 2 weeks  1. Independent in HEP and progression per patient tolerance, in order to prevent re-injury. []? Progressing: [x]? Met: []? Not Met: []? Adjusted       2. Patient will have a decrease in pain to facilitate improvement in movement, function, and ADLs as indicated by Functional Deficits.   []? Progressing: [x]? Met: []? Not Met: []? Adjusted          Long Term Goals: To be achieved in: 12 weeks  1. Disability index score of 20% or less for the LEFS to assist with reaching prior level of function. []? Progressing: []? Met: []? Not Met: []? Adjusted      2. Patient will demonstrate increased AROM to equal the opposite side bilaterally to allow for proper joint functioning as indicated by patients Functional Deficits. []? Progressing: []? Met: []? Not Met: []? Adjusted       3. Patient will demonstrate an increase in strength to be within 3lbs on the HHD or match bilaterally to allow for proper functional mobility as indicated by patients Functional Deficits. []? Progressing: []? Met: []? Not Met: []? Adjusted       4. Patient will return to all transfers, work activities, and functional activities without increased symptoms or restriction. []? Progressing: []? Met: []? Not Met: []? Adjusted       5. Patient will have 0/10 pain with ADL's.  []? Progressing: []? Met: []? Not Met: []? Adjusted       6. Patient stated goal: Return to work with no limitations w/ no AD.   []? Progressing: []? Met: []? Not Met: []? Adjusted       Overall Progression Towards Functional goals/ Treatment Progress Update:  [x] Patient is progressing as expected towards functional goals listed. [] Progression is slowed due to complexities/Impairments listed. [] Progression has been slowed due to co-morbidities.   [] Plan just implemented, too soon to assess goals progression <30days   [] Goals require adjustment due to lack of progress  [] Patient is not progressing as expected and requires additional follow up with physician  [] Other    Prognosis for POC: [x] Good [] Fair  [] Poor    Patient requires continued skilled intervention: [x] Yes  [] No    Treatment/Activity Tolerance:  [x] Patient able to complete treatment  [] Patient limited by fatigue  [] Patient limited by pain    [] Patient limited by other medical complications  [] Other:     Return to Play: (if applicable)   []  Stage 1: Intro to Strength   []  Stage 2: Return to Run and Strength   []  Stage 3: Return to Jump and Strength   []  Stage 4: Dynamic Strength and Agility   []  Stage 5: Sport Specific Training     []  Ready to Return to Play, Meets All Above Stages   []  Not Ready for Return to Sports   Comments:                         PLAN: See eval  [x] Continue per plan of care [] Alter current plan (see comments above)  [] Plan of care initiated [] Hold pending MD visit [] Discharge    Electronically signed by:  Pamella Fothergill [id number]    Note: If patient does not return for scheduled/ recommended follow up visits, this note will serve as a discharge from care along with most recent update on progress.

## 2020-09-11 ENCOUNTER — HOSPITAL ENCOUNTER (OUTPATIENT)
Dept: PHYSICAL THERAPY | Age: 50
Setting detail: THERAPIES SERIES
Discharge: HOME | End: 2020-09-11
Payer: COMMERCIAL

## 2020-09-11 NOTE — FLOWSHEET NOTE
Physical Therapy  Cancellation/No-show Note  Patient Name:  Livan Multani  :  1970   Date:  2020  Cancels to Date: 1  No-shows to Date: 0    For today's appointment patient:  [x]  Cancelled  []  Rescheduled appointment  []  No-show     Reason given by patient:  [x]  Patient ill  []  Conflicting appointment  []  No transportation    []  Conflict with work  []  No reason given  []  Other:     Comments:      Electronically signed by:  Bharathi Nugent, [id number]

## 2020-09-15 ENCOUNTER — HOSPITAL ENCOUNTER (OUTPATIENT)
Dept: PHYSICAL THERAPY | Age: 50
Setting detail: THERAPIES SERIES
Discharge: HOME | End: 2020-09-15
Payer: COMMERCIAL

## 2020-09-15 PROCEDURE — 97140 MANUAL THERAPY 1/> REGIONS: CPT

## 2020-09-15 PROCEDURE — 97110 THERAPEUTIC EXERCISES: CPT

## 2020-09-15 NOTE — FLOWSHEET NOTE
[facility] MEDICAL [facility]    Physical Therapy Treatment Note/ Progress Report:     Date:  9/15/2020    Patient Name:  Henry Casiano    :  1970  MRN: [id number]  Restrictions/Precautions:    Medical/Treatment Diagnosis Information:  · Diagnosis: R ankle ORIF (Trimalleolar fx) 20  · Treatment Diagnosis: A65.252U  Insurance/Certification information:  PT Insurance Information: [facility]  Physician Information:  Referring Practitioner: Sanjuana Murray  Has the plan of care been signed (Y/N):        [x]  Yes  []  No      Date of Patient follow up with Physician: 2020    Is this a Progress Report:     []  Yes  [x]  No      If Yes:  Date Range for reporting period:  Initial Eval: 2020  Beginnin2020 --- Endin2020    Progress report will be due (10 Rx or 30 days whichever is less):    nv for MD on      Recertification will be due (POC Duration  / 90 days whichever is less): 2020      Visit # Insurance Allowable Auth Required   In Person 6 20 []  Yes     []  No    Tele Health 0  []  Yes     []  No    Total 6        Functional Scale: LEFS: 100% (Score: 0/80)   Date assessed: 2020      Latex Allergy:  [x]NO      []YES  Preferred Language for Healthcare:   [x]English       [x]other:    Pain level:  1-3/10 but mostly 1/10    SUBJECTIVE:  Reports no changes    OBJECTIVE: See eval   Observation:    Test measurements:      RESTRICTIONS/PRECAUTIONS:     NWB - \"Like her to continue be nonweightbearing for another 4 weeks. Should stay in the boot for 24 hours a day for the another 2 weeks at which point she is no longer asked to sleep in it. (2020).  - Dr. Channie Apley ROM in DF/PF planes only at this time. - Dr. Sanjuana Murray    Exercises/Interventions:   Therapeutic Ex (84587)  Therapeutic Activity (93844)  NMR re-education (82776) Sets/Reps Notes/CUES   Bike                    Long sitting gastroc / soleus 3x30\" ea    Long sitting HSS 3x30\"    Ankle pumps x20    Ankle iso DF/PF x10 ea    Quad sets 2x10    Glut sets 2x10    Hip/knee flex  2x10 Modified heel slide without dragging heel   SLR flex, ext, abd 2#  3x10    Prone hams curl 4# 3x10         Seated HR / TR x20 ea    Seated toe crunches x20 ea    Seated LAQ w/adduct rex 4#   3x10    Plantar flx with yellow- lightly 3x10 Small Pure motion   DF yellow-lighly 2x10 Small Pure motion        At home pt will leave her boot on for extra weight with PREs                                                          Manual Intervention (30490)     PROM / STM/ 1st and 5th ray mobiliz, calc mobiliz, scar massage to non scabbed areas (inst pt in this also)   25 min DF/ PF range only                                                Patient Education  min Provided biomechanics/ergonomics training to reduce stress across injured/healing structures. Weight bearing education. Provided Education in post-operative precautions/contraindications. Therapeutic Exercise and NMR EXR:  15 min  [x] (31890) Provided verbal/tactile cueing for activities related to strengthening, flexibility, endurance, ROM for improvements in LE, proximal hip, and core control with self care, mobility, lifting, ambulation. [x] (73558) Provided verbal/tactile cueing for activities related to improving balance, coordination, kinesthetic sense, posture, motor skill, proprioception to assist with LE, proximal hip, and core control in self-care, mobility, lifting, ambulation and eccentric single leg control. NMR and Therapeutic Activities:    [x] (41233 or 24524) Provided verbal/tactile cueing for activities related to improving balance, coordination, kinesthetic sense, posture, motor skill, proprioception and motor activation to allow for proper function of core, proximal hip and LE with self-care and ADLs and functional mobility.    [x] (58623) Gait Re-education- Provided training and instruction to the patient for proper LE, core and proximal hip recruitment and positioning and eccentric body weight control with ambulation re-education including up and down stairs     Home Exercise Program:    [x] (22911) Reviewed/Progressed HEP activities related to strengthening, flexibility, endurance, ROM of core, proximal hip and LE for functional self-care, mobility, lifting and ambulation/stair navigation   [x] (89685) Reviewed/Progressed HEP activities related to improving balance, coordination, kinesthetic sense, posture, motor skill, proprioception of core, proximal hip and LE for self-care, mobility, lifting, and ambulation/stair navigation      Manual Treatments:  PROM / STM / Oscillations-Mobs:  G-I, II, III, IV (PA's, Inf., Post.)  [x] (39525) Provided manual therapy to mobilize LE, proximal hip and/or LS spine soft tissue/joints for the purpose of modulating pain, promoting relaxation, increasing ROM, reducing/eliminating soft tissue swelling/inflammation/restriction, improving soft tissue extensibility and allowing for proper ROM for normal function with self-care, mobility, lifting and ambulation. Modalities:      Charges:  Timed Code Treatment Minutes: 43    Total Treatment Minutes:  43    BWC:  TE TIME:  NMR TIME:  MANUAL TIME:  UNTIMED MINUTES:               [] EVAL (LOW) 22126 (typically 20 minutes face-to-face)  [] EVAL (MOD) 79318 (typically 30 minutes face-to-face)  [] EVAL (HIGH) 49349 (typically 45 minutes face-to-face)  [] RE-EVAL     [x] WK(85301) x 1    [] IONTO  [] NMR (25515) x     [] VASO  [x] Manual (92526) x 2     [] Other:  [] TA x      [] Mech Traction (33700)  [] ES(attended) (67976)      [] ES (un) (53298):    ASSESSMENT:   9/4/20 improved AROM after rx.  0-2 ' DF    GOALS:   Short Term Goals: To be achieved in: 2 weeks  1. Independent in HEP and progression per patient tolerance, in order to prevent re-injury. []? Progressing: [x]? Met: []? Not Met: []? Adjusted       2.  Patient will have a decrease in pain to facilitate improvement in movement, function, and ADLs as indicated by Functional Deficits. []? Progressing: [x]? Met: []? Not Met: []? Adjusted          Long Term Goals: To be achieved in: 12 weeks  1. Disability index score of 20% or less for the LEFS to assist with reaching prior level of function. []? Progressing: []? Met: []? Not Met: []? Adjusted      2. Patient will demonstrate increased AROM to equal the opposite side bilaterally to allow for proper joint functioning as indicated by patients Functional Deficits. []? Progressing: []? Met: []? Not Met: []? Adjusted       3. Patient will demonstrate an increase in strength to be within 3lbs on the HHD or match bilaterally to allow for proper functional mobility as indicated by patients Functional Deficits. []? Progressing: []? Met: []? Not Met: []? Adjusted       4. Patient will return to all transfers, work activities, and functional activities without increased symptoms or restriction. []? Progressing: []? Met: []? Not Met: []? Adjusted       5. Patient will have 0/10 pain with ADL's.  []? Progressing: []? Met: []? Not Met: []? Adjusted       6. Patient stated goal: Return to work with no limitations w/ no AD.   []? Progressing: []? Met: []? Not Met: []? Adjusted       Overall Progression Towards Functional goals/ Treatment Progress Update:  [x] Patient is progressing as expected towards functional goals listed. [] Progression is slowed due to complexities/Impairments listed. [] Progression has been slowed due to co-morbidities.   [] Plan just implemented, too soon to assess goals progression <30days   [] Goals require adjustment due to lack of progress  [] Patient is not progressing as expected and requires additional follow up with physician  [] Other    Prognosis for POC: [x] Good [] Fair  [] Poor    Patient requires continued skilled intervention: [x] Yes  [] No    Treatment/Activity Tolerance:  [x] Patient able to complete treatment  [] Patient limited by fatigue  [] Patient

## 2020-09-17 ENCOUNTER — HOSPITAL ENCOUNTER (OUTPATIENT)
Dept: PHYSICAL THERAPY | Age: 50
Setting detail: THERAPIES SERIES
Discharge: HOME | End: 2020-09-17
Payer: COMMERCIAL

## 2020-09-17 PROCEDURE — 97110 THERAPEUTIC EXERCISES: CPT

## 2020-09-17 PROCEDURE — 97140 MANUAL THERAPY 1/> REGIONS: CPT

## 2020-09-17 NOTE — FLOWSHEET NOTE
[facility]    Physical Therapy Treatment Note/ Progress Report:     Date:  2020    Patient Name:  Sahna Price    :  1970  MRN: [id number]  Restrictions/Precautions:    Medical/Treatment Diagnosis Information:  · Diagnosis: R ankle ORIF (Trimalleolar fx) 20  · Treatment Diagnosis: W98.059Y  Insurance/Certification information:  PT Insurance Information: [employer]  Physician Information:  Referring Practitioner: John Enciso  Has the plan of care been signed (Y/N):        [x]  Yes  []  No      Date of Patient follow up with Physician: 2020    Is this a Progress Report:     []  Yes  [x]  No      If Yes:  Date Range for reporting period:  Initial Eval: 2020  Beginnin2020 --- Endin2020    Progress report will be due (10 Rx or 30 days whichever is less):    nv for MD on 2189     Recertification will be due (POC Duration  / 90 days whichever is less): 2020      Visit # Insurance Allowable Auth Required   In Person 7 20 []  Yes     []  No    Tele Health 0  []  Yes     []  No    Total 7        Functional Scale: LEFS: 100% (Score: 0/80)   Date assessed: 2020      Latex Allergy:  [x]NO      []YES  Preferred Language for Healthcare:   [x]English       [x]other:    Pain level:  1-3/10 but mostly 1/10    SUBJECTIVE:  Reports no changes    OBJECTIVE:    Observation:    Test measurements:    AROM:  PF 40  DF lacking 2  PROM:  PF 45  DF 2  (after PROM/manual)      RESTRICTIONS/PRECAUTIONS:     NWB - \"Like her to continue be nonweightbearing for another 4 weeks. Should stay in the boot for 24 hours a day for the another 2 weeks at which point she is no longer asked to sleep in it. (2020).  - Dr. Card Power ROM in DF/PF planes only at this time. - Dr. John Enciso    Exercises/Interventions:   Therapeutic Ex (89312)  Therapeutic Activity (65866)  NMR re-education (20167) Sets/Reps Notes/CUES   Bike                    Long sitting gastroc / soleus 3x30\" ea    Long sitting HSS 3x30\"    Ankle pumps x20    Ankle iso DF/PF x10 ea    Quad sets 2x10    Glut sets 2x10    Hip/knee flex  2x10 Modified heel slide without dragging heel   SLR flex, ext, abd 3#  3x10    Prone hams curl 6# 3x10         Seated HR / TR x20 ea    Seated toe crunches x20 ea    Seated LAQ w/adduct rex 6#   3x10    Plantar flx with yellow- lightly 3x10 Small Pure motion   DF yellow-lighly 2x10 Small Pure motion        At home pt will leave her boot on for extra weight with PREs                                                          Manual Intervention (31966)     PROM / STM/ 1st and 5th ray mobiliz, calc mobiliz, scar massage to non scabbed areas (inst pt in this also)   25 min DF/ PF range only                                                Patient Education  min Provided biomechanics/ergonomics training to reduce stress across injured/healing structures. Weight bearing education. Provided Education in post-operative precautions/contraindications. Therapeutic Exercise and NMR EXR:  15 min  [x] (61977) Provided verbal/tactile cueing for activities related to strengthening, flexibility, endurance, ROM for improvements in LE, proximal hip, and core control with self care, mobility, lifting, ambulation. [x] (48929) Provided verbal/tactile cueing for activities related to improving balance, coordination, kinesthetic sense, posture, motor skill, proprioception to assist with LE, proximal hip, and core control in self-care, mobility, lifting, ambulation and eccentric single leg control. NMR and Therapeutic Activities:    [x] (60251 or 87006) Provided verbal/tactile cueing for activities related to improving balance, coordination, kinesthetic sense, posture, motor skill, proprioception and motor activation to allow for proper function of core, proximal hip and LE with self-care and ADLs and functional mobility.    [x] (13660) Gait Re-education- Provided training and instruction to the patient complete treatment  [] Patient limited by fatigue  [] Patient limited by pain    [] Patient limited by other medical complications  [] Other:     Return to Play: (if applicable)   []  Stage 1: Intro to Strength   []  Stage 2: Return to Run and Strength   []  Stage 3: Return to Jump and Strength   []  Stage 4: Dynamic Strength and Agility   []  Stage 5: Sport Specific Training     []  Ready to Return to Play, Meets All Above Stages   []  Not Ready for Return to Sports   Comments:                         PLAN: See eval  [x] Continue per plan of care [] Alter current plan (see comments above)  [] Plan of care initiated [] Hold pending MD visit [] Discharge    Electronically signed by:  Joey Zhu  [id number]    Note: If patient does not return for scheduled/ recommended follow up visits, this note will serve as a discharge from care along with most recent update on progress.

## 2020-09-17 NOTE — PROGRESS NOTES
[address] [place], [address]  PHONE [phone]  [phone]      Physical Therapy MD Progress Report    Date:  2020    Patient Name:  Jak Crowe    :  1970  MRN: [id number]  Restrictions/Precautions:    Medical/Treatment Diagnosis Information:  · Diagnosis: R ankle ORIF (Trimalleolar fx) 20  · Treatment Diagnosis: C51.793Y  Insurance/Certification information:  PT Insurance Information: [facility]  Physician Information:  Referring Practitioner: Modesta Wesley  Has the plan of care been signed (Y/N):        [x]  Yes  []  No      Date of Patient follow up with Physician: 2020    Is this a Progress Report:     []  Yes  [x]  No      If Yes:  Date Range for reporting period:  Initial Eval: 2020  Beginnin2020 --- Endin2020    Progress report will be due (10 Rx or 30 days whichever is less):    nv for MD on 5842     Recertification will be due (POC Duration  / 90 days whichever is less): 2020      Visit # Insurance Allowable Auth Required   In Person 7 20 []  Yes     []  No    Tele Health 0  []  Yes     []  No    Total 7        Functional Scale: LEFS: 100% (Score: 0/80)   Date assessed: 2020      Latex Allergy:  [x]NO      []YES  Preferred Language for Healthcare:   [x]English       [x]other:    Pain level:  1-3/10 but mostly 1/10    SUBJECTIVE:  Reports she is  medial ankle especially with pressure from boot    OBJECTIVE:    Observation: pt doing well with scooter, incisions healing nicely, very little edema if any and no noted warmth   Test measurements:    AROM:  PF 40  DF lacking 2  PROM:  PF 45  DF 2  (after PROM/manual)      RESTRICTIONS/PRECAUTIONS:     NWB - \"Like her to continue be nonweightbearing for another 4 weeks. Should stay in the boot for 24 hours a day for the another 2 weeks at which point she is no longer asked to sleep in it. (2020).  - Dr. Jaylin Edmonds ROM in DF/PF planes only at this time. - Dr. Beronica Felder:  She is tolerating therapy well. She has been NWB for 4-5 weeks. Would expect her to do well in future visits. GOALS:   Short Term Goals: To be achieved in: 2 weeks  1. Independent in HEP and progression per patient tolerance, in order to prevent re-injury. []? Progressing: [x]? Met: []? Not Met: []? Adjusted       2. Patient will have a decrease in pain to facilitate improvement in movement, function, and ADLs as indicated by Functional Deficits. []? Progressing: [x]? Met: []? Not Met: []? Adjusted          Long Term Goals: To be achieved in: 12 weeks  1. Disability index score of 20% or less for the LEFS to assist with reaching prior level of function. []? Progressing: []? Met: []? Not Met: []? Adjusted      2. Patient will demonstrate increased AROM to equal the opposite side bilaterally to allow for proper joint functioning as indicated by patients Functional Deficits. [x]? Progressing: []? Met: []? Not Met: []? Adjusted       3. Patient will demonstrate an increase in strength to be within 3lbs on the HHD or match bilaterally to allow for proper functional mobility as indicated by patients Functional Deficits. [x]? Progressing: []? Met: []? Not Met: []? Adjusted       4. Patient will return to all transfers, work activities, and functional activities without increased symptoms or restriction. [x]? Progressing: []? Met: []? Not Met: []? Adjusted       5. Patient will have 0/10 pain with ADL's. [x]? Progressing: []? Met: []? Not Met: []? Adjusted       6. Patient stated goal: Return to work with no limitations w/ no AD. [x]? Progressing: []? Met: []? Not Met: []? Adjusted       Overall Progression Towards Functional goals/ Treatment Progress Update:  [x] Patient is progressing as expected towards functional goals listed. [] Progression is slowed due to complexities/Impairments listed.   [] Progression has been slowed due to co-morbidities. [] Plan just implemented, too soon to assess goals progression <30days   [] Goals require adjustment due to lack of progress  [] Patient is not progressing as expected and requires additional follow up with physician  [] Other    Prognosis for POC: [x] Good [] Fair  [] Poor    Patient requires continued skilled intervention: [x] Yes  [] No    Treatment/Activity Tolerance:  [x] Patient able to complete treatment  [] Patient limited by fatigue  [] Patient limited by pain    [] Patient limited by other medical complications  [] Other:     Return to Play: (if applicable)   []  Stage 1: Intro to Strength   []  Stage 2: Return to Run and Strength   []  Stage 3: Return to Jump and Strength   []  Stage 4: Dynamic Strength and Agility   []  Stage 5: Sport Specific Training     []  Ready to Return to Play, Meets All Above Stages   []  Not Ready for Return to Sports   Comments:                         PLAN: See eval  [x] Continue per plan of care [] Alter current plan (see comments above)  [] Plan of care initiated [] Hold pending MD visit [] Discharge    Electronically signed by:  Sylvester Johnson  [id number]  Lewis Salazar PT [id number]    Note: If patient does not return for scheduled/ recommended follow up visits, this note will serve as a discharge from care along with most recent update on progress.

## 2020-09-21 ENCOUNTER — OFFICE VISIT (OUTPATIENT)
Dept: ORTHOPEDIC SURGERY | Age: 50
End: 2020-09-21
Payer: COMMERCIAL

## 2020-09-21 VITALS — BODY MASS INDEX: 25.69 KG/M2 | HEIGHT: 63 IN | WEIGHT: 145 LBS

## 2020-09-21 PROCEDURE — 99024 POSTOP FOLLOW-UP VISIT: CPT | Performed by: ORTHOPAEDIC SURGERY

## 2020-09-21 PROCEDURE — L1902 AFO ANKLE GAUNTLET PRE OTS: HCPCS | Performed by: ORTHOPAEDIC SURGERY

## 2020-09-21 NOTE — LETTER
[place] [address] [address] [facility] [address]  Phone: [phone]  Fax: [phone]    Lana Valerio MD        September 21, 2020     Patient: Dmitriy Mathew   YOB: 1970   Date of Visit: 9/21/2020       To Whom It May Concern: It is my medical opinion that Lanny Dias should remain out of work until 10/19/2020. If you have any questions or concerns, please don't hesitate to call.     Sincerely,        Lana Valerio MD

## 2020-09-21 NOTE — PROGRESS NOTES
Subjective: Patient is here for 6-week post-op after ORIF right trimalleolar ankle fracture    Objective: Physical exam shows well-healed incisions, neurovascular intact right ankle, some mild stiffness in the right ankle but very minimal swelling. No evidence of infection or postoperative complication. Imaging: Weightbearing AP lateral oblique are Schobert redemonstrate appropriate reduction surgical fixation of trimalleolar ankle fracture with syndesmotic stabilization. No acute osseous abnormality or evidence of complication. Assessment and plan: I did nice discussion with the patient today. At this point we will have her start to walk in the boot as tolerated. I would like her to do that for the next week or so and then wean into Brittany brace. She will walk in a Brittany brace for 2 weeks and then wean out of that. I would like to see her back in 6 weeks time. We will have her continue therapy in the meantime to progressively work on her motion. This should help her motion should prove significantly when she is able to do weightbearing stretch. She is weightbearing as tolerated.

## 2020-09-22 ENCOUNTER — TELEPHONE (OUTPATIENT)
Dept: ORTHOPEDIC SURGERY | Age: 50
End: 2020-09-22

## 2020-09-24 ENCOUNTER — HOSPITAL ENCOUNTER (OUTPATIENT)
Dept: PHYSICAL THERAPY | Age: 50
Setting detail: THERAPIES SERIES
Discharge: HOME | End: 2020-09-24
Payer: COMMERCIAL

## 2020-09-24 PROCEDURE — 97110 THERAPEUTIC EXERCISES: CPT

## 2020-09-24 PROCEDURE — 97116 GAIT TRAINING THERAPY: CPT

## 2020-09-24 PROCEDURE — 97140 MANUAL THERAPY 1/> REGIONS: CPT

## 2020-09-24 NOTE — FLOWSHEET NOTE
[facility], [facility]    Physical Therapy Treatment Note/ Progress Report:     Date:  2020    Patient Name:  Adi Bruce    :  1970  MRN: [id number]  Restrictions/Precautions:    Medical/Treatment Diagnosis Information:  · Diagnosis: R ankle ORIF (Trimalleolar fx) 20  · Treatment Diagnosis: Q06.738W  Insurance/Certification information:  PT Insurance Information: [facility]  Physician Information:  Referring Practitioner: Kashmir Dove  Has the plan of care been signed (Y/N):        [x]  Yes  []  No      Date of Patient follow up with Physician: 2020    Is this a Progress Report:     []  Yes  [x]  No      If Yes:  Date Range for reporting period:  Initial Eval: 2020  Beginnin2020 --- Endin2020    Progress report will be due (10 Rx or 30 days whichever is less):    nv for MD on 2022     Recertification will be due (POC Duration  / 90 days whichever is less): 2020      Visit # Insurance Allowable Auth Required   In Person 7 20 []  Yes     []  No    Tele Health 0  []  Yes     []  No    Total 7        Functional Scale: LEFS: 100% (Score: 0/80)   Date assessed: 2020      Latex Allergy:  [x]NO      []YES  Preferred Language for Healthcare:   [x]English       [x]other:    Pain level:  1-3/10 but mostly 1/10    SUBJECTIVE:  Pt arrived NWB in boot with crutch but reports she is allowed to be WBAT. No other new reports from pt. OBJECTIVE:    Observation:    Test measurements:        RESTRICTIONS/PRECAUTIONS:     Assessment and plan: I did nice discussion with the patient today. At this point we will have her start to walk in the boot as tolerated. I would like her to do that for the next week or so and then wean into Brittany brace. She will walk in a Brittany brace for 2 weeks and then wean out of that. I would like to see her back in 6 weeks time. We will have her continue therapy in the meantime to progressively work on her motion.   This coordination, kinesthetic sense, posture, motor skill, proprioception to assist with LE, proximal hip, and core control in self-care, mobility, lifting, ambulation and eccentric single leg control. NMR and Therapeutic Activities:    [x] (03892 or 76035) Provided verbal/tactile cueing for activities related to improving balance, coordination, kinesthetic sense, posture, motor skill, proprioception and motor activation to allow for proper function of core, proximal hip and LE with self-care and ADLs and functional mobility. [x] (41900) Gait Re-education- Provided training and instruction to the patient for proper LE, core and proximal hip recruitment and positioning and eccentric body weight control with ambulation re-education including up and down stairs     Home Exercise Program:    [x] (10708) Reviewed/Progressed HEP activities related to strengthening, flexibility, endurance, ROM of core, proximal hip and LE for functional self-care, mobility, lifting and ambulation/stair navigation   [x] (03147) Reviewed/Progressed HEP activities related to improving balance, coordination, kinesthetic sense, posture, motor skill, proprioception of core, proximal hip and LE for self-care, mobility, lifting, and ambulation/stair navigation      Manual Treatments:  PROM / STM / Oscillations-Mobs:  G-I, II, III, IV (PA's, Inf., Post.)  [x] (81244) Provided manual therapy to mobilize LE, proximal hip and/or LS spine soft tissue/joints for the purpose of modulating pain, promoting relaxation, increasing ROM, reducing/eliminating soft tissue swelling/inflammation/restriction, improving soft tissue extensibility and allowing for proper ROM for normal function with self-care, mobility, lifting and ambulation.      Modalities:      Charges:  Timed Code Treatment Minutes: 45   Total Treatment Minutes:  45   BWC:  TE TIME:  NMR TIME:  MANUAL TIME:  UNTIMED MINUTES:               [] EVAL (LOW) 82208 (typically 20 minutes Patient is progressing as expected towards functional goals listed. [] Progression is slowed due to complexities/Impairments listed. [] Progression has been slowed due to co-morbidities. [] Plan just implemented, too soon to assess goals progression <30days   [] Goals require adjustment due to lack of progress  [] Patient is not progressing as expected and requires additional follow up with physician  [] Other    Prognosis for POC: [x] Good [] Fair  [] Poor    Patient requires continued skilled intervention: [x] Yes  [] No    Treatment/Activity Tolerance:  [x] Patient able to complete treatment  [] Patient limited by fatigue  [] Patient limited by pain    [] Patient limited by other medical complications  [] Other:     Return to Play: (if applicable)   []  Stage 1: Intro to Strength   []  Stage 2: Return to Run and Strength   []  Stage 3: Return to Jump and Strength   []  Stage 4: Dynamic Strength and Agility   []  Stage 5: Sport Specific Training     []  Ready to Return to Play, Meets All Above Stages   []  Not Ready for Return to Sports   Comments:                         PLAN: See eval  [x] Continue per plan of care [] Alter current plan (see comments above)  [] Plan of care initiated [] Hold pending MD visit [] Discharge    Electronically signed by:  Gene Waggoner  [id number]    Note: If patient does not return for scheduled/ recommended follow up visits, this note will serve as a discharge from care along with most recent update on progress.

## 2020-09-29 ENCOUNTER — HOSPITAL ENCOUNTER (OUTPATIENT)
Dept: PHYSICAL THERAPY | Age: 50
Setting detail: THERAPIES SERIES
Discharge: HOME | End: 2020-09-29
Payer: COMMERCIAL

## 2020-09-29 PROCEDURE — 97140 MANUAL THERAPY 1/> REGIONS: CPT

## 2020-09-29 PROCEDURE — 97110 THERAPEUTIC EXERCISES: CPT

## 2020-09-29 PROCEDURE — 97116 GAIT TRAINING THERAPY: CPT

## 2020-09-29 NOTE — FLOWSHEET NOTE
Formerly [facility], Former[facility]    Physical Therapy Treatment Note/ Progress Report:     Date:  2020    Patient Name:  Elana Headings    :  1970  MRN: [id number]  Restrictions/Precautions:    Medical/Treatment Diagnosis Information:  · Diagnosis: R ankle ORIF (Trimalleolar fx) 20  · Treatment Diagnosis: D57.842D  Insurance/Certification information:  PT Insurance Information: BC  Physician Information:  Referring Practitioner: Regino Pichardo  Has the plan of care been signed (Y/N):        [x]  Yes  []  No      Date of Patient follow up with Physician: 2020    Is this a Progress Report:     []  Yes  [x]  No      If Yes:  Date Range for reporting period:  Initial Eval: 2020  Beginnin2020 --- Endin2020    Progress report will be due (10 Rx or 30 days whichever is less):    nv for MD on      Recertification will be due (POC Duration  / 90 days whichever is less): 2020      Visit # Insurance Allowable Auth Required   In Person 8 20 []  Yes     []  No    Tele Health 0  []  Yes     []  No    Total 8        Functional Scale: LEFS: 100% (Score: 0/80)   Date assessed: 2020      Latex Allergy:  [x]NO      []YES  Preferred Language for Healthcare:   [x]English       [x]other:    Pain level:  1-3/10 but mostly 1/10    SUBJECTIVE:      OBJECTIVE:    Observation:    Test measurements:        RESTRICTIONS/PRECAUTIONS:     Assessment and plan: I did nice discussion with the patient today. At this point we will have her start to walk in the boot as tolerated. I would like her to do that for the next week or so and then wean into Brittany brace. She will walk in a Brittnay brace for 2 weeks and then wean out of that. I would like to see her back in 6 weeks time. We will have her continue therapy in the meantime to progressively work on her motion. This should help her motion should prove significantly when she is able to do weightbearing stretch.   She is weightbearing as tolerated      Exercises/Interventions:   Therapeutic Ex (18116)  Therapeutic Activity (77639)  NMR re-education (98455) Sets/Reps Notes/CUES   Bike     NUSTEP S7 L5 X 5' w/brace             Long sitting gastroc / soleus 3x30\" ea    Long sitting HSS 3x30\"    Ankle pumps x20    Ankle iso DF/PF x10 ea    Quad sets 2x10    Glut sets 2x10    Hip/knee flex  2x10 Modified heel slide without dragging heel   SLR flex, ext, abd 3#  3x10    Prone hams curl 6# 3x10          standing HR / TR 2x10 ea    Seated toe crunches x20 ea    Seated LAQ w/adduct rex 6#   3x10    Plantar flx with red- lightly 3x10 Small Pure motion   DF red-lighly 2x10 Small Pure motion        At home pt will leave her boot on for extra weight with PREs                            GAIT:  -Pregait WB wt shift M/L and F/B   5'    -amb in // bars with two hands 5' w/brace Close SBA   -amb WBAT with 1 crutches 50' w/brace  close SBA   -amb without crutch W/brace 2 laps in bars and 30'x 2 in open area Close SBA   -stair training with 1 and 2 hand rails 4 stairs x 3 and 1 respectively  With brace on  CGA-close SBA   -curb training with 1 crutches and no crutch with boot on X 3' ea CGA        Step ups fwd  2\" 2x10 Brace, 1 hand   Step up bwd 2\" 2x10 Brace, 2 hand             Manual Intervention (34914)     PROM / STM/ 1st and 5th ray mobiliz, calc mobiliz, scar massage to non scabbed areas (inst pt in this also)   15 min DF/ PF range only                                                Patient Education  min Provided biomechanics/ergonomics training to reduce stress across injured/healing structures. Weight bearing education. Provided Education in post-operative precautions/contraindications.         Therapeutic Exercise and NMR EXR:  15 min  [x] (93318) Provided verbal/tactile cueing for activities related to strengthening, flexibility, endurance, ROM for improvements in LE, proximal hip, and core control with self care, mobility, lifting, TIME:  MANUAL TIME:  UNTIMED MINUTES:               [] EVAL (LOW) 49966 (typically 20 minutes face-to-face)  [] EVAL (MOD) 91360 (typically 30 minutes face-to-face)  [] EVAL (HIGH) 91699 (typically 45 minutes face-to-face)  [] RE-EVAL     [x] XN(01721) x 1    [] IONTO  [] NMR (01558) x     [] VASO  [x] Manual (48377) x 1   [x] Other: x 1  [] TA x      [] Mech Traction (90888)  [] ES(attended) (66397)      [] ES (un) (84907):    ASSESSMENT:      GOALS:   Short Term Goals: To be achieved in: 2 weeks  1. Independent in HEP and progression per patient tolerance, in order to prevent re-injury. []? Progressing: [x]? Met: []? Not Met: []? Adjusted       2. Patient will have a decrease in pain to facilitate improvement in movement, function, and ADLs as indicated by Functional Deficits. []? Progressing: [x]? Met: []? Not Met: []? Adjusted          Long Term Goals: To be achieved in: 12 weeks  1. Disability index score of 20% or less for the LEFS to assist with reaching prior level of function. []? Progressing: []? Met: []? Not Met: []? Adjusted      2. Patient will demonstrate increased AROM to equal the opposite side bilaterally to allow for proper joint functioning as indicated by patients Functional Deficits. [x]? Progressing: []? Met: []? Not Met: []? Adjusted       3. Patient will demonstrate an increase in strength to be within 3lbs on the HHD or match bilaterally to allow for proper functional mobility as indicated by patients Functional Deficits. [x]? Progressing: []? Met: []? Not Met: []? Adjusted       4. Patient will return to all transfers, work activities, and functional activities without increased symptoms or restriction. [x]? Progressing: []? Met: []? Not Met: []? Adjusted       5. Patient will have 0/10 pain with ADL's. [x]? Progressing: []? Met: []? Not Met: []? Adjusted       6. Patient stated goal: Return to work with no limitations w/ no AD.   []? Progressing: []? Met: []?  Not Met: []? Adjusted       Overall Progression Towards Functional goals/ Treatment Progress Update:  [x] Patient is progressing as expected towards functional goals listed. [] Progression is slowed due to complexities/Impairments listed. [] Progression has been slowed due to co-morbidities. [] Plan just implemented, too soon to assess goals progression <30days   [] Goals require adjustment due to lack of progress  [] Patient is not progressing as expected and requires additional follow up with physician  [] Other    Prognosis for POC: [x] Good [] Fair  [] Poor    Patient requires continued skilled intervention: [x] Yes  [] No    Treatment/Activity Tolerance:  [x] Patient able to complete treatment with no pain reports only stiffness noted [] Patient limited by fatigue  [] Patient limited by pain    [] Patient limited by other medical complications  [] Other:     Return to Play: (if applicable)   []  Stage 1: Intro to Strength   []  Stage 2: Return to Run and Strength   []  Stage 3: Return to Jump and Strength   []  Stage 4: Dynamic Strength and Agility   []  Stage 5: Sport Specific Training     []  Ready to Return to Play, Meets All Above Stages   []  Not Ready for Return to Sports   Comments:                         PLAN: See eval  [x] Continue per plan of care [] Alter current plan (see comments above)  [] Plan of care initiated [] Hold pending MD visit [] Discharge    Electronically signed by:  Sanjay Sin  [id number]    Note: If patient does not return for scheduled/ recommended follow up visits, this note will serve as a discharge from care along with most recent update on progress.

## 2020-09-30 ENCOUNTER — HOSPITAL ENCOUNTER (OUTPATIENT)
Dept: PHYSICAL THERAPY | Age: 50
Setting detail: THERAPIES SERIES
Discharge: HOME | End: 2020-09-30
Payer: COMMERCIAL

## 2020-09-30 PROCEDURE — 97110 THERAPEUTIC EXERCISES: CPT

## 2020-09-30 PROCEDURE — 97116 GAIT TRAINING THERAPY: CPT

## 2020-09-30 PROCEDURE — 97112 NEUROMUSCULAR REEDUCATION: CPT

## 2020-09-30 PROCEDURE — 97140 MANUAL THERAPY 1/> REGIONS: CPT

## 2020-09-30 NOTE — FLOWSHEET NOTE
[facility]    Physical Therapy Treatment Note/ Progress Report:     Date:  2020    Patient Name:  Barry Fernandez    :  1970  MRN: [id number]  Restrictions/Precautions:    Medical/Treatment Diagnosis Information:  · Diagnosis: R ankle ORIF (Trimalleolar fx) 20  · Treatment Diagnosis: T85.817X  Insurance/Certification information:  PT Insurance Information: [facility]  Physician Information:  Referring Practitioner: Chiki Clayton  Has the plan of care been signed (Y/N):        [x]  Yes  []  No      Date of Patient follow up with Physician: 2020    Is this a Progress Report:     []  Yes  [x]  No      If Yes:  Date Range for reporting period:  Initial Eval: 2020  Beginnin2020 --- Endin2020    Progress report will be due (10 Rx or 30 days whichever is less):    nv for MD on      Recertification will be due (POC Duration  / 90 days whichever is less): 2020      Visit # Insurance Allowable Auth Required   In Person  20 []  Yes     []  No    Tele Health 0  []  Yes     []  No    Total 8        Functional Scale: LEFS: 100% (Score: 0/80)   Date assessed: 2020      Latex Allergy:  [x]NO      []YES  Preferred Language for Healthcare:   [x]English       [x]other:    Pain level:  1-3/10 but mostly 1/10    SUBJECTIVE:  20 has not been using her ankle brace, still using her boot. Discussed the need to make the change. She is in agreement. OBJECTIVE:    Observation:    Test measurements:        RESTRICTIONS/PRECAUTIONS:     Assessment and plan: I did nice discussion with the patient today. At this point we will have her start to walk in the boot as tolerated. I would like her to do that for the next week or so and then wean into Brittany brace. She will walk in a Brittany brace for 2 weeks and then wean out of that. I would like to see her back in 6 weeks time.   We will have her continue therapy in the meantime to progressively work on her motion. This should help her motion should prove significantly when she is able to do weightbearing stretch. She is weightbearing as tolerated      Exercises/Interventions:   Therapeutic Ex (62915)  Therapeutic Activity (26392)  NMR re-education (03865) Sets/Reps Notes/CUES   Bike     NUSTEP S7 L5 X 5' w/brace             Long sitting gastroc / soleus 3x30\" ea    Long sitting HSS 3x30\"    Ankle pumps x20    Ankle iso DF/PF x10 ea    Quad sets 2x10    Glut sets 2x10    Hip/knee flex  2x10 Modified heel slide without dragging heel   SLR flex, ext, abd 3#  3x10    Prone hams curl 6# 3x10              Seated toe crunches x20 ea    Seated LAQ w/adduct rex 6#   3x10    Plantar flx with red- lightly 3x10 Small Pure motion   DF red-lighly 2x10 Small Pure motion   9/30/20  Gait  In llbars- reg, side to side, center line, backwards 5 min HEP   stepups forwd 4 in  2x10    stepups backwd 2 in  2x 10    Lateral step down w/ left 2 in  2 x10    Heel raise/toe lifts on level 1 in  2x 10 HEP   Blue foam- shifts M/L   A/P x20 each No support.    rockerboard   M/L  A/P x20 each    Cone walk around bars 3 laps    Single stand- 1 finger support 30 sec x3 HEP   Sit back squats 2x10     ankle t bd x 3 2x10  yellow HEP                          GAIT:  -Pregait WB wt shift M/L and F/B   5'    -amb in // bars with two hands 5' w/brace Close SBA   -amb WBAT with 1 crutches 50' w/brace  close SBA   -amb without crutch W/brace 2 laps in bars and 30'x 2 in open area Close SBA   -stair training with 1 and 2 hand rails 4 stairs x 3 and 1 respectively  With brace on  CGA-close SBA   -curb training with 1 crutches and no crutch with boot on X 3' ea CGA        Step ups fwd  2\" 2x10 Brace, 1 hand   Step up bwd 2\" 2x10 Brace, 2 hand             Manual Intervention (03044)     PROM / STM/ 1st and 5th ray mobiliz, calc mobiliz, scar massage to non scabbed areas (inst pt in this also)   15 min DF/ PF range only Patient Education  min Provided biomechanics/ergonomics training to reduce stress across injured/healing structures. Weight bearing education. Provided Education in post-operative precautions/contraindications. Therapeutic Exercise and NMR EXR:  15 min  [x] (66120) Provided verbal/tactile cueing for activities related to strengthening, flexibility, endurance, ROM for improvements in LE, proximal hip, and core control with self care, mobility, lifting, ambulation. [x] (21916) Provided verbal/tactile cueing for activities related to improving balance, coordination, kinesthetic sense, posture, motor skill, proprioception to assist with LE, proximal hip, and core control in self-care, mobility, lifting, ambulation and eccentric single leg control. NMR and Therapeutic Activities:  15 min  [x] (85085 or 91436) Provided verbal/tactile cueing for activities related to improving balance, coordination, kinesthetic sense, posture, motor skill, proprioception and motor activation to allow for proper function of core, proximal hip and LE with self-care and ADLs and functional mobility.    [x] (50129)        15 min       Gait Re-education- Provided training and instruction to the patient for proper LE, core and proximal hip recruitment and positioning and eccentric body weight control with ambulation re-education including up and down stairs     Home Exercise Program:    [x] (80283) Reviewed/Progressed HEP activities related to strengthening, flexibility, endurance, ROM of core, proximal hip and LE for functional self-care, mobility, lifting and ambulation/stair navigation   [x] (50869) Reviewed/Progressed HEP activities related to improving balance, coordination, kinesthetic sense, posture, motor skill, proprioception of core, proximal hip and LE for self-care, mobility, lifting, and ambulation/stair navigation      Manual Treatments:   15 min     Ankle distraction, sub talar distractionn, global moiliz of calc, 1st and 5th rays, grt toe. PROM / STM / Oscillations-Mobs:  G-I, II, III, IV (PA's, Inf., Post.)  [x] (44006) Provided manual therapy to mobilize LE, proximal hip and/or LS spine soft tissue/joints for the purpose of modulating pain, promoting relaxation, increasing ROM, reducing/eliminating soft tissue swelling/inflammation/restriction, improving soft tissue extensibility and allowing for proper ROM for normal function with self-care, mobility, lifting and ambulation. Modalities:      Charges:  Timed Code Treatment Minutes: 60    Total Treatment Minutes:  60    BWC:  TE TIME:  NMR TIME:  MANUAL TIME:  UNTIMED MINUTES:               [] EVAL (LOW) 26592 (typically 20 minutes face-to-face)  [] EVAL (MOD) 66044 (typically 30 minutes face-to-face)  [] EVAL (HIGH) 17167 (typically 45 minutes face-to-face)  [] RE-EVAL     [x] MM(72105) x 1    [] IONTO  [x] NMR (28811) x     [] VASO  [x] Manual (53750) x 1   [x] Other: x 1 gait. [] TA x      [] Mech Traction (20396)  [] ES(attended) (13720)      [] ES (un) (46753):    ASSESSMENT:  Improving. Wants to return to work in 3 weeks. She will have to work hard at home for strenght, balance, and ROM. Would expect her to do well. Tolerated new ex well today       GOALS:   Short Term Goals: To be achieved in: 2 weeks  1. Independent in HEP and progression per patient tolerance, in order to prevent re-injury. []? Progressing: [x]? Met: []? Not Met: []? Adjusted       2. Patient will have a decrease in pain to facilitate improvement in movement, function, and ADLs as indicated by Functional Deficits. []? Progressing: [x]? Met: []? Not Met: []? Adjusted          Long Term Goals: To be achieved in: 12 weeks  1. Disability index score of 20% or less for the LEFS to assist with reaching prior level of function. []? Progressing: []? Met: []? Not Met: []? Adjusted      2.  Patient will demonstrate increased AROM to equal the opposite side bilaterally to allow for proper joint functioning as indicated by patients Functional Deficits. [x]? Progressing: []? Met: []? Not Met: []? Adjusted       3. Patient will demonstrate an increase in strength to be within 3lbs on the HHD or match bilaterally to allow for proper functional mobility as indicated by patients Functional Deficits. [x]? Progressing: []? Met: []? Not Met: []? Adjusted       4. Patient will return to all transfers, work activities, and functional activities without increased symptoms or restriction. [x]? Progressing: []? Met: []? Not Met: []? Adjusted       5. Patient will have 0/10 pain with ADL's. [x]? Progressing: []? Met: []? Not Met: []? Adjusted       6. Patient stated goal: Return to work with no limitations w/ no AD.   []? Progressing: []? Met: []? Not Met: []? Adjusted       Overall Progression Towards Functional goals/ Treatment Progress Update:  [x] Patient is progressing as expected towards functional goals listed. [] Progression is slowed due to complexities/Impairments listed. [] Progression has been slowed due to co-morbidities.   [] Plan just implemented, too soon to assess goals progression <30days   [] Goals require adjustment due to lack of progress  [] Patient is not progressing as expected and requires additional follow up with physician  [] Other    Prognosis for POC: [x] Good [] Fair  [] Poor    Patient requires continued skilled intervention: [x] Yes  [] No    Treatment/Activity Tolerance:  [x] Patient able to complete treatment with no pain reports only stiffness noted [] Patient limited by fatigue  [] Patient limited by pain    [] Patient limited by other medical complications  [] Other:     Return to Play: (if applicable)   []  Stage 1: Intro to Strength   []  Stage 2: Return to Run and Strength   []  Stage 3: Return to Jump and Strength   []  Stage 4: Dynamic Strength and Agility   []  Stage 5: Sport Specific Training     []  Ready to Return to Play, Meets All Above Stages   []  Not Ready for Return to Sports   Comments:                         PLAN: See eval  [x] Continue per plan of care [] Alter current plan (see comments above)  [] Plan of care initiated [] Hold pending MD visit [] Discharge    Electronically signed by:  Grace Palencia, PT   [id number]    Note: If patient does not return for scheduled/ recommended follow up visits, this note will serve as a discharge from care along with most recent update on progress.

## 2020-10-01 ENCOUNTER — APPOINTMENT (OUTPATIENT)
Dept: PHYSICAL THERAPY | Age: 50
End: 2020-10-01
Payer: COMMERCIAL

## 2020-10-06 ENCOUNTER — HOSPITAL ENCOUNTER (OUTPATIENT)
Dept: PHYSICAL THERAPY | Age: 50
Setting detail: THERAPIES SERIES
Discharge: HOME | End: 2020-10-06
Payer: COMMERCIAL

## 2020-10-06 PROCEDURE — 97140 MANUAL THERAPY 1/> REGIONS: CPT

## 2020-10-06 PROCEDURE — 97110 THERAPEUTIC EXERCISES: CPT

## 2020-10-06 PROCEDURE — 97116 GAIT TRAINING THERAPY: CPT

## 2020-10-06 PROCEDURE — 97112 NEUROMUSCULAR REEDUCATION: CPT

## 2020-10-06 NOTE — FLOWSHEET NOTE
of that. I would like to see her back in 6 weeks time. We will have her continue therapy in the meantime to progressively work on her motion. This should help her motion should prove significantly when she is able to do weightbearing stretch. She is weightbearing as tolerated      Exercises/Interventions:   Therapeutic Ex (91610)  Therapeutic Activity (40658)  NMR re-education (94523) Sets/Reps Notes/CUES   Bike     NUSTEP S7 L5 X 6' w/brace             Long sitting gastroc / soleus 3x30\" ea    Long sitting HSS 3x30\"    Ankle pumps x20    Ankle iso DF/PF x10 ea    Quad sets 2x10    Glut sets 2x10    Hip/knee flex  2x10 Modified heel slide without dragging heel   SLR flex, ext, abd 3#  3x10    Prone hams curl 6# 3x10              Seated toe crunches x20 ea    Seated LAQ w/adduct rex 6#   3x10    Plantar flx with red- lightly 3x10 Small Pure motion   DF red-lighly 2x10 Small Pure motion   9/30/20-10/6/20  Gait  In llbars- reg, side to side, center line, backwards 5 min HEP   stepups forwd 4 in  2x10 1 hand    stepups backwd 2 in  2x 10 2 hand   Lateral step down w/ left 2 in  2 x10    Fwd dips 2\" 2x10  2/10 discomfort   Heel raise/toe lifts on level 1 in  2x 10 HEP   Blue foam- shifts M/L   A/P x20 each No support.    rockerboard   M/L  A/P x20 each    Cone walk around bars 3 laps    Single stand- 1 finger support 30 sec x3 HEP   Sit back squats 2x10     ankle t bd x 3 2x10  yellow HEP                          GAIT:  -Pregait WB wt shift M/L and F/B   5'    -amb in // bars with two hands 5' w/brace Close SBA   -amb WBAT with 1 crutches 50' w/brace  close SBA   -amb without crutch W/brace  30'x 3 in open area Close SBA  Working on toe off   -stair training with 1 and 2 hand rails 4 stairs x 3 and 1 respectively  With brace on  CGA-close SBA   -curb training with 1 crutches and no crutch with boot on X 3' ea CGA                            Manual Intervention (69208)     PROM / STM/ 1st and 5th ray mobiliz, calc mobiliz, scar massage to non scabbed areas (inst pt in this also)  see below min DF/ PF range only                                                Patient Education  min Provided biomechanics/ergonomics training to reduce stress across injured/healing structures. Weight bearing education. Provided Education in post-operative precautions/contraindications. Therapeutic Exercise and NMR EXR:  15 min  [x] (88628) Provided verbal/tactile cueing for activities related to strengthening, flexibility, endurance, ROM for improvements in LE, proximal hip, and core control with self care, mobility, lifting, ambulation. [x] (68984) Provided verbal/tactile cueing for activities related to improving balance, coordination, kinesthetic sense, posture, motor skill, proprioception to assist with LE, proximal hip, and core control in self-care, mobility, lifting, ambulation and eccentric single leg control. NMR and Therapeutic Activities:  15 min  [x] (22390 or 34139) Provided verbal/tactile cueing for activities related to improving balance, coordination, kinesthetic sense, posture, motor skill, proprioception and motor activation to allow for proper function of core, proximal hip and LE with self-care and ADLs and functional mobility.    [x] (53989)        13 min       Gait Re-education- Provided training and instruction to the patient for proper LE, core and proximal hip recruitment and positioning and eccentric body weight control with ambulation re-education including up and down stairs     Home Exercise Program:    [x] (16133) Reviewed/Progressed HEP activities related to strengthening, flexibility, endurance, ROM of core, proximal hip and LE for functional self-care, mobility, lifting and ambulation/stair navigation   [x] (22694) Reviewed/Progressed HEP activities related to improving balance, coordination, kinesthetic sense, posture, motor skill, proprioception of core, proximal hip and LE for self-care, mobility, lifting, and ambulation/stair navigation      Manual Treatments:   15 min     Ankle distraction, sub talar distractionn, global moiliz of calc, 1st and 5th rays, grt toe. PROM / STM / Oscillations-Mobs:  G-I, II, III, IV (PA's, Inf., Post.)  [x] (13489) Provided manual therapy to mobilize LE, proximal hip and/or LS spine soft tissue/joints for the purpose of modulating pain, promoting relaxation, increasing ROM, reducing/eliminating soft tissue swelling/inflammation/restriction, improving soft tissue extensibility and allowing for proper ROM for normal function with self-care, mobility, lifting and ambulation. Modalities:      Charges:  Timed Code Treatment Minutes: 58   Total Treatment Minutes:  58   BWC:  TE TIME:  NMR TIME:  MANUAL TIME:  UNTIMED MINUTES:               [] EVAL (LOW) 08453 (typically 20 minutes face-to-face)  [] EVAL (MOD) 35411 (typically 30 minutes face-to-face)  [] EVAL (HIGH) 12143 (typically 45 minutes face-to-face)  [] RE-EVAL     [x] AG(95676) x 1    [] IONTO  [x] NMR (83683) x     [] VASO  [x] Manual (35783) x 1   [x] Other: x 1 gait. [] TA x      [] Mech Traction (28218)  [] ES(attended) (15344)      [] ES (un) (55453):    ASSESSMENT:  Improving. Wants to return to work in 3 weeks. She will have to work hard at home for strenght, balance, and ROM. Would expect her to do well. Tolerated new ex well today       GOALS:   Short Term Goals: To be achieved in: 2 weeks  1. Independent in HEP and progression per patient tolerance, in order to prevent re-injury. []? Progressing: [x]? Met: []? Not Met: []? Adjusted       2. Patient will have a decrease in pain to facilitate improvement in movement, function, and ADLs as indicated by Functional Deficits. []? Progressing: [x]? Met: []? Not Met: []? Adjusted          Long Term Goals: To be achieved in: 12 weeks  1. Disability index score of 20% or less for the LEFS to assist with reaching prior level of function. []? Progressing: []?  Met: []? Not Met: []? Adjusted      2. Patient will demonstrate increased AROM to equal the opposite side bilaterally to allow for proper joint functioning as indicated by patients Functional Deficits. [x]? Progressing: []? Met: []? Not Met: []? Adjusted       3. Patient will demonstrate an increase in strength to be within 3lbs on the HHD or match bilaterally to allow for proper functional mobility as indicated by patients Functional Deficits. [x]? Progressing: []? Met: []? Not Met: []? Adjusted       4. Patient will return to all transfers, work activities, and functional activities without increased symptoms or restriction. [x]? Progressing: []? Met: []? Not Met: []? Adjusted       5. Patient will have 0/10 pain with ADL's. [x]? Progressing: []? Met: []? Not Met: []? Adjusted       6. Patient stated goal: Return to work with no limitations w/ no AD.   []? Progressing: []? Met: []? Not Met: []? Adjusted       Overall Progression Towards Functional goals/ Treatment Progress Update:  [x] Patient is progressing as expected towards functional goals listed. [] Progression is slowed due to complexities/Impairments listed. [] Progression has been slowed due to co-morbidities.   [] Plan just implemented, too soon to assess goals progression <30days   [] Goals require adjustment due to lack of progress  [] Patient is not progressing as expected and requires additional follow up with physician  [] Other    Prognosis for POC: [x] Good [] Fair  [] Poor    Patient requires continued skilled intervention: [x] Yes  [] No    Treatment/Activity Tolerance:  [x] Patient able to complete treatment with no pain reports only stiffness noted [] Patient limited by fatigue  [] Patient limited by pain    [] Patient limited by other medical complications  [] Other:     Return to Play: (if applicable)   []  Stage 1: Intro to Strength   []  Stage 2: Return to Run and Strength   []  Stage 3: Return to Jump and Strength   []  Stage 4: Dynamic Strength and Agility   []  Stage 5: Sport Specific Training     []  Ready to Return to Play, Meets All Above Stages   []  Not Ready for Return to Sports   Comments:                         PLAN: See eval  [x] Continue per plan of care [] Alter current plan (see comments above)  [] Plan of care initiated [] Hold pending MD visit [] Discharge    Electronically signed by:  Jw Rodriguez   [id number]    Note: If patient does not return for scheduled/ recommended follow up visits, this note will serve as a discharge from care along with most recent update on progress.

## 2020-10-08 ENCOUNTER — TELEPHONE (OUTPATIENT)
Dept: ORTHOPEDIC SURGERY | Age: 50
End: 2020-10-08

## 2020-10-12 ENCOUNTER — HOSPITAL ENCOUNTER (OUTPATIENT)
Dept: PHYSICAL THERAPY | Age: 50
Setting detail: THERAPIES SERIES
Discharge: HOME | End: 2020-10-12
Payer: COMMERCIAL

## 2020-10-12 PROCEDURE — 97112 NEUROMUSCULAR REEDUCATION: CPT

## 2020-10-12 PROCEDURE — 97110 THERAPEUTIC EXERCISES: CPT

## 2020-10-12 PROCEDURE — 97140 MANUAL THERAPY 1/> REGIONS: CPT

## 2020-10-12 PROCEDURE — 97116 GAIT TRAINING THERAPY: CPT

## 2020-10-12 NOTE — FLOWSHEET NOTE
[facility], [facility]    Physical Therapy Treatment Note/ Progress Report:     Date:  10/12/2020    Patient Name:  Aliya Ashley    :  1970  MRN: [id number]  Restrictions/Precautions:    Medical/Treatment Diagnosis Information:  · Diagnosis: R ankle ORIF (Trimalleolar fx) 20  · Treatment Diagnosis: U87.503L  Insurance/Certification information:  PT Insurance Information: BC  Physician Information:  Referring Practitioner: Lyle Palomino  Has the plan of care been signed (Y/N):        [x]  Yes  []  No      Date of Patient follow up with Physician: 2020    Is this a Progress Report:     []  Yes  [x]  No      If Yes:  Date Range for reporting period:  Initial Eval: 2020  Beginnin2020 --- Endin2020    Progress report will be due (10 Rx or 30 days whichever is less): For MD on      Recertification will be due (POC Duration  / 90 days whichever is less): 2020      Visit # Insurance Allowable Auth Required   In Person  11 20 []  Yes     []  No    Tele Health 0  []  Yes     []  No    Total 10       Functional Scale: LEFS: 100% (Score: 0/80)   Date assessed: 2020      Latex Allergy:  [x]NO      []YES  Preferred Language for Healthcare:   [x]English       [x]other:    Pain level:  1- 2/10 but mostly 1/10  Occupation:  at [employer]. Stands/walks  70% of the day on concrete or thin carpet. Wants to return 11/3/20    SUBJECTIVE: 10/6/20 reports she feels mostly stiff and apprehensive when she is walking. Reports she did drive herself today - again apprehensive. Starting 10/15/20  Thurs, she will wean ou of the brace. 20 has not been using her ankle brace, still using her boot. Discussed the need to make the change. She is in agreement. OBJECTIVE:    Observation:    Test measurements:        RESTRICTIONS/PRECAUTIONS:     Assessment and plan: I did nice discussion with the patient today.   At this point we will have her start to walk in the boot as tolerated. I would like her to do that for the next week or so and then wean into Brittany brace. She will walk in a Brittany brace for 2 weeks and then wean out of that. I would like to see her back in 6 weeks time. We will have her continue therapy in the meantime to progressively work on her motion. This should help her motion should prove significantly when she is able to do weightbearing stretch. She is weightbearing as tolerated      Exercises/Interventions:   Surgery 8/4/20  Therapeutic Ex (84370)  Therapeutic Activity (43778)  NMR re-education (99753) Sets/Reps Notes/CUES   Bike     NUSTEP S7 L5 X 6' w/brace             Long sitting gastroc / soleus 3x30\" ea    Long sitting HSS 3x30\"    Ankle pumps x20    Ankle iso DF/PF x10 ea    Quad sets 2x10    Glut sets 2x10    Hip/knee flex  2x10 Modified heel slide without dragging heel   SLR flex, ext, abd 3#  3x10    Prone hams curl 6# 3x10              Seated toe crunches x20 ea    Seated LAQ w/adduct rex 6#   3x10    Plantar flx with red- lightly 3x10 Small Pure motion   DF red-lighly 2x10 Small Pure motion   9/30/20-10/6/20  Gait  In llbars- reg, side to side, center line, backwards 5 min HEP   stepups forwd 4 in  2x10 1 hand    stepups backwd 2 in  2x 10 2 hand   Lateral step down w/ left 2 in  2 x10    Fwd dips 2\" 2x10  2/10 discomfort   Heel raise/toe lifts on level- shift wt R 1 in  2x 10 HEP   Blue foam- shifts M/L   A/P x20 each No support.    bosu flat    M/L  A/P x10 each    Cone walk around bars 3 laps    Single stand- 1 finger support 30 sec x3 HEP   Sit back squats 2x10     ankle t bd x 3 2x10  yellow HEP   Single stand x3                      GAIT:  -Pregait WB wt shift M/L and F/B   5'    -amb in // bars with two hands 5' w/brace Close SBA   -amb WBAT with 1 crutches 50' w/brace  close SBA   -amb without crutch W/brace  30'x 3 in open area Close SBA  Working on toe off   -stair training with 1 and 2 hand rails 4 stairs x 3 and 1 respectively  With brace on  CGA-close SBA   -curb training with 1 crutches and no crutch with boot on X 3' ea CGA                            Manual Intervention (44983)     PROM / STM/ 1st and 5th ray mobiliz, calc mobiliz, scar massage to non scabbed areas (inst pt in this also)  see below min DF/ PF range only                                                Patient Education  min Provided biomechanics/ergonomics training to reduce stress across injured/healing structures. Weight bearing education. Provided Education in post-operative precautions/contraindications. Therapeutic Exercise and NMR EXR:    15 min  [x] (41745) Provided verbal/tactile cueing for activities related to strengthening, flexibility, endurance, ROM for improvements in LE, proximal hip, and core control with self care, mobility, lifting, ambulation. [x] (66006) Provided verbal/tactile cueing for activities related to improving balance, coordination, kinesthetic sense, posture, motor skill, proprioception to assist with LE, proximal hip, and core control in self-care, mobility, lifting, ambulation and eccentric single leg control. NMR and Therapeutic Activities:    10 min  [x] (74373 or 51236) Provided verbal/tactile cueing for activities related to improving balance, coordination, kinesthetic sense, posture, motor skill, proprioception and motor activation to allow for proper function of core, proximal hip and LE with self-care and ADLs and functional mobility.    [x] (82596)         10 min       Gait Re-education- Provided training and instruction to the patient for proper LE, core and proximal hip recruitment and positioning and eccentric body weight control with ambulation re-education including up and down stairs     Home Exercise Program:    [x] (97649) Reviewed/Progressed HEP activities related to strengthening, flexibility, endurance, ROM of core, proximal hip and LE for functional self-care, mobility, lifting and ambulation/stair navigation   [x] (01703) Reviewed/Progressed HEP activities related to improving balance, coordination, kinesthetic sense, posture, motor skill, proprioception of core, proximal hip and LE for self-care, mobility, lifting, and ambulation/stair navigation      Manual Treatments:   15 min     Ankle distraction, sub talar distractionn, global moiliz of calc, 1st and 5th rays, grt toe. Post glide of talus, post glide of distal tib/fib jt. PROM / STM / Oscillations-Mobs:  G-I, II, III, IV (PA's, Inf., Post.)  [x] (63651) Provided manual therapy to mobilize LE, proximal hip and/or LS spine soft tissue/joints for the purpose of modulating pain, promoting relaxation, increasing ROM, reducing/eliminating soft tissue swelling/inflammation/restriction, improving soft tissue extensibility and allowing for proper ROM for normal function with self-care, mobility, lifting and ambulation. Modalities:      Charges:  Timed Code Treatment Minutes: 50    Total Treatment Minutes:  55    BWC:  TE TIME:  NMR TIME:  MANUAL TIME:  UNTIMED MINUTES:               [] EVAL (LOW) 06221 (typically 20 minutes face-to-face)  [] EVAL (MOD) 52585 (typically 30 minutes face-to-face)  [] EVAL (HIGH) 24085 (typically 45 minutes face-to-face)  [] RE-EVAL     [x] FG(06765) x 1    [] IONTO  [x] NMR (89916) x     [] VASO  [x] Manual (99584) x 1   [x] Other: x 1 gait. [] TA x      [] Mech Traction (78176)  [] ES(attended) (79810)      [] ES (un) (80488):    ASSESSMENT:  Improving. Wants to return to work in 3 weeks. She will have to work hard at home for strenght, balance, and ROM. Would expect her to do well. Tolerated new ex well today       GOALS:   Short Term Goals: To be achieved in: 2 weeks  1. Independent in HEP and progression per patient tolerance, in order to prevent re-injury. []? Progressing: [x]? Met: []? Not Met: []? Adjusted       2.  Patient will have a decrease in pain to facilitate improvement in movement, function, and ADLs as indicated by Functional Deficits. []? Progressing: [x]? Met: []? Not Met: []? Adjusted          Long Term Goals: To be achieved in: 12 weeks  1. Disability index score of 20% or less for the LEFS to assist with reaching prior level of function. []? Progressing: []? Met: []? Not Met: []? Adjusted      2. Patient will demonstrate increased AROM to equal the opposite side bilaterally to allow for proper joint functioning as indicated by patients Functional Deficits. [x]? Progressing: []? Met: []? Not Met: []? Adjusted       3. Patient will demonstrate an increase in strength to be within 3lbs on the HHD or match bilaterally to allow for proper functional mobility as indicated by patients Functional Deficits. [x]? Progressing: []? Met: []? Not Met: []? Adjusted       4. Patient will return to all transfers, work activities, and functional activities without increased symptoms or restriction. [x]? Progressing: []? Met: []? Not Met: []? Adjusted       5. Patient will have 0/10 pain with ADL's. [x]? Progressing: []? Met: []? Not Met: []? Adjusted       6. Patient stated goal: Return to work with no limitations w/ no AD.   []? Progressing: []? Met: []? Not Met: []? Adjusted       Overall Progression Towards Functional goals/ Treatment Progress Update:  [x] Patient is progressing as expected towards functional goals listed. [] Progression is slowed due to complexities/Impairments listed. [] Progression has been slowed due to co-morbidities.   [] Plan just implemented, too soon to assess goals progression <30days   [] Goals require adjustment due to lack of progress  [] Patient is not progressing as expected and requires additional follow up with physician  [] Other    Prognosis for POC: [x] Good [] Fair  [] Poor    Patient requires continued skilled intervention: [x] Yes  [] No    Treatment/Activity Tolerance:  [x] Patient able to complete treatment with no pain reports only stiffness noted [] Patient limited by fatigue  [] Patient limited by pain    [] Patient limited by other medical complications  [] Other:     Return to Play: (if applicable)   []  Stage 1: Intro to Strength   []  Stage 2: Return to Run and Strength   []  Stage 3: Return to Jump and Strength   []  Stage 4: Dynamic Strength and Agility   []  Stage 5: Sport Specific Training     []  Ready to Return to Play, Meets All Above Stages   []  Not Ready for Return to Sports   Comments:                         PLAN: See eval  [x] Continue per plan of care [] Alter current plan (see comments above)  [] Plan of care initiated [] Hold pending MD visit [] Discharge    Electronically signed by:  Orlin Bernal PT   [id number]    Note: If patient does not return for scheduled/ recommended follow up visits, this note will serve as a discharge from care along with most recent update on progress.

## 2020-10-13 ENCOUNTER — TELEPHONE (OUTPATIENT)
Dept: ORTHOPEDIC SURGERY | Age: 50
End: 2020-10-13

## 2020-10-13 NOTE — TELEPHONE ENCOUNTER
FAXED [facility] 97/33/6506 TO [facility] @ [facility] @ [phone]

## 2020-10-14 ENCOUNTER — TELEPHONE (OUTPATIENT)
Dept: ORTHOPEDIC SURGERY | Age: 50
End: 2020-10-14

## 2020-10-14 NOTE — TELEPHONE ENCOUNTER
FAXED A NO RECORDS FOR DATE REQUESTED STATEMENT [id number]/74/7644 TO PRESENT TO Ryley Gutierrez @ [facility] @ [phone]

## 2020-10-15 ENCOUNTER — HOSPITAL ENCOUNTER (OUTPATIENT)
Dept: PHYSICAL THERAPY | Age: 50
Setting detail: THERAPIES SERIES
Discharge: HOME | End: 2020-10-15
Payer: COMMERCIAL

## 2020-10-15 PROCEDURE — 97140 MANUAL THERAPY 1/> REGIONS: CPT

## 2020-10-15 PROCEDURE — 97112 NEUROMUSCULAR REEDUCATION: CPT

## 2020-10-15 PROCEDURE — 97116 GAIT TRAINING THERAPY: CPT

## 2020-10-15 PROCEDURE — 97110 THERAPEUTIC EXERCISES: CPT

## 2020-10-15 NOTE — FLOWSHEET NOTE
[facility], [facility]    Physical Therapy Treatment Note/ Progress Report:     Date:  10/15/2020    Patient Name:  Moi Ashley    :  1970  MRN: [id number]  Restrictions/Precautions:    Medical/Treatment Diagnosis Information:  · Diagnosis: R ankle ORIF (Trimalleolar fx) 20  · Treatment Diagnosis: E46.020L  Insurance/Certification information:  PT Insurance Information: [facility]  Physician Information:  Referring Practitioner: Quentin Ruiz  Has the plan of care been signed (Y/N):        [x]  Yes  []  No      Date of Patient follow up with Physician: 2020    Is this a Progress Report:     []  Yes  [x]  No      If Yes:  Date Range for reporting period:  Initial Eval: 2020  Beginnin2020 --- Endin2020    Progress report will be due (10 Rx or 30 days whichever is less): For MD on      Recertification will be due (POC Duration  / 90 days whichever is less): 2020      Visit # Insurance Allowable Auth Required   In Person  12 20 []  Yes     []  No    Tele Health 0  []  Yes     []  No    Total 12       Functional Scale: LEFS: 100% (Score: 0/80)   Date assessed: 2020      Latex Allergy:  [x]NO      []YES  Preferred Language for Healthcare:   [x]English       [x]other:    Pain level:  1- 2/10 but mostly 1/10  Occupation:  at [employer]. Stands/walks  70% of the day on concrete or thin carpet. Wants to return 11/3/20    SUBJECTIVE: 10/15/20  Reports she is starting to feel better with driving  15/6/62 reports she feels mostly stiff and apprehensive when she is walking. Reports she did drive herself today - again apprehensive. Starting 10/15/20  Thurs, she will wean ou of the brace. 20 has not been using her ankle brace, still using her boot. Discussed the need to make the change. She is in agreement.     OBJECTIVE:    Observation:    Test measurements:        RESTRICTIONS/PRECAUTIONS:     Assessment and plan: I did nice discussion with the patient today. At this point we will have her start to walk in the boot as tolerated. I would like her to do that for the next week or so and then wean into Brittany brace. She will walk in a Brittany brace for 2 weeks and then wean out of that. I would like to see her back in 6 weeks time. We will have her continue therapy in the meantime to progressively work on her motion. This should help her motion should prove significantly when she is able to do weightbearing stretch. She is weightbearing as tolerated      Exercises/Interventions:   Surgery 8/4/20  Therapeutic Ex (49244)  Therapeutic Activity (26363)  NMR re-education (17751) Sets/Reps Notes/CUES   Bike     NUSTEP S7 L5 X 6' w/brace             Long sitting gastroc / soleus 3x30\" ea    Long sitting HSS 3x30\"    Ankle pumps x20    Ankle iso DF/PF x10 ea    Quad sets 2x10    Glut sets 2x10    Hip/knee flex  2x10 Modified heel slide without dragging heel   SLR flex, ext, abd 3#  3x10    Prone hams curl 6# 3x10              Seated toe crunches x20 ea    Seated LAQ w/adduct rex 6#   3x10    Plantar flx with red- lightly 3x10 Small Pure motion   DF red-lighly 2x10 Small Pure motion   9/30/20-10/6/20  Gait  In llbars- reg, side to side, center line, backwards 5 min HEP   stepups forwd 4 in  2x10 1 hand    stepups backwd 2 in  2x 10 2 hand   Lateral step down w/ left 2 in  2 x10    Fwd dips 2\" 2x10  2/10 discomfort   Heel raise/toe lifts on level- shift wt R 1 in  2x 10 HEP   Blue foam- shifts M/L   A/P x20 each No support.    bosu flat    M/L  A/P x10 each    Cone walk around bars 3 laps    Single stand- 1 finger support 30 sec x3 HEP   Sit back squats 2x10     ankle t bd x 3 2x10  yellow HEP   Single stand x3                      GAIT:  -Pregait WB wt shift M/L and F/B 10' total    -amb in // bars with two hands 5' w/brace Close SBA   -amb WBAT with 1 crutches 50' w/brace  close SBA   -stair training with 1 and 2 hand rails 4 stairs x 3 and 1 respectively  With brace on     -curb training with 1 crutches and no crutch with boot on X 3' ea CGA   Heel strike with tape squares 10 laps 10ft                        Manual Intervention ([phone])     PROM / STM/ 1st and 5th ray mobiliz, calc mobiliz, scar massage to non scabbed areas (inst pt in this also)  see below min DF/ PF range only                                                Patient Education  min Provided biomechanics/ergonomics training to reduce stress across injured/healing structures. Weight bearing education. Provided Education in post-operative precautions/contraindications. Therapeutic Exercise and NMR EXR:    15 min  [x] (05685) Provided verbal/tactile cueing for activities related to strengthening, flexibility, endurance, ROM for improvements in LE, proximal hip, and core control with self care, mobility, lifting, ambulation. [x] (51632) Provided verbal/tactile cueing for activities related to improving balance, coordination, kinesthetic sense, posture, motor skill, proprioception to assist with LE, proximal hip, and core control in self-care, mobility, lifting, ambulation and eccentric single leg control. NMR and Therapeutic Activities:    10 min  [x] (73223 or 48357) Provided verbal/tactile cueing for activities related to improving balance, coordination, kinesthetic sense, posture, motor skill, proprioception and motor activation to allow for proper function of core, proximal hip and LE with self-care and ADLs and functional mobility.    [x] (87673)         10 min       Gait Re-education- Provided training and instruction to the patient for proper LE, core and proximal hip recruitment and positioning and eccentric body weight control with ambulation re-education including up and down stairs     Home Exercise Program:    [x] (50363) Reviewed/Progressed HEP activities related to strengthening, flexibility, endurance, ROM of core, proximal hip and LE for functional self-care, mobility, lifting and ambulation/stair navigation   [x] (77794) Reviewed/Progressed HEP activities related to improving balance, coordination, kinesthetic sense, posture, motor skill, proprioception of core, proximal hip and LE for self-care, mobility, lifting, and ambulation/stair navigation      Manual Treatments:   15 min     Ankle distraction, sub talar distraction, global moiliz of calc, 1st and 5th rays, grt toe. Post glide of talus, post glide of distal tib/fib jt. PROM / STM / Oscillations-Mobs:  G-I, II, III, IV (PA's, Inf., Post.)  [x] (43044) Provided manual therapy to mobilize LE, proximal hip and/or LS spine soft tissue/joints for the purpose of modulating pain, promoting relaxation, increasing ROM, reducing/eliminating soft tissue swelling/inflammation/restriction, improving soft tissue extensibility and allowing for proper ROM for normal function with self-care, mobility, lifting and ambulation. Modalities:      Charges:  Timed Code Treatment Minutes: 50   Total Treatment Minutes:  50   BWC:  TE TIME:  NMR TIME:  MANUAL TIME:  UNTIMED MINUTES:               [] EVAL (LOW) 90281 (typically 20 minutes face-to-face)  [] EVAL (MOD) 85240 (typically 30 minutes face-to-face)  [] EVAL (HIGH) 29828 (typically 45 minutes face-to-face)  [] RE-EVAL     [x] CG(77152) x 1    [] IONTO  [x] NMR (14419) x  1   [] VASO  [x] Manual (71462) x 1   [x] Other: x 1 gait. [] TA x      [] [facility] (86412)  [] ES(attended) (39747)      [] ES (un) (05537):    ASSESSMENT:  Improving. Wants to return to work in 3 weeks. She will have to work hard at home for strenght, balance, and ROM. Would expect her to do well. Tolerated new ex well today       GOALS:   Short Term Goals: To be achieved in: 2 weeks  1. Independent in HEP and progression per patient tolerance, in order to prevent re-injury. []? Progressing: [x]? Met: []? Not Met: []? Adjusted       2.  Patient will have a decrease in pain to facilitate improvement in movement, function, and ADLs as indicated by Functional Deficits. []? Progressing: [x]? Met: []? Not Met: []? Adjusted          Long Term Goals: To be achieved in: 12 weeks  1. Disability index score of 20% or less for the LEFS to assist with reaching prior level of function. []? Progressing: []? Met: []? Not Met: []? Adjusted      2. Patient will demonstrate increased AROM to equal the opposite side bilaterally to allow for proper joint functioning as indicated by patients Functional Deficits. [x]? Progressing: []? Met: []? Not Met: []? Adjusted       3. Patient will demonstrate an increase in strength to be within 3lbs on the HHD or match bilaterally to allow for proper functional mobility as indicated by patients Functional Deficits. [x]? Progressing: []? Met: []? Not Met: []? Adjusted       4. Patient will return to all transfers, work activities, and functional activities without increased symptoms or restriction. [x]? Progressing: []? Met: []? Not Met: []? Adjusted       5. Patient will have 0/10 pain with ADL's. [x]? Progressing: []? Met: []? Not Met: []? Adjusted       6. Patient stated goal: Return to work with no limitations w/ no AD.   []? Progressing: []? Met: []? Not Met: []? Adjusted       Overall Progression Towards Functional goals/ Treatment Progress Update:  [x] Patient is progressing as expected towards functional goals listed. [] Progression is slowed due to complexities/Impairments listed. [] Progression has been slowed due to co-morbidities.   [] Plan just implemented, too soon to assess goals progression <30days   [] Goals require adjustment due to lack of progress  [] Patient is not progressing as expected and requires additional follow up with physician  [] Other    Prognosis for POC: [x] Good [] Fair  [] Poor    Patient requires continued skilled intervention: [x] Yes  [] No    Treatment/Activity Tolerance:  [x] Patient able to complete treatment with no pain reports only stiffness noted [] Patient limited by fatigue  [] Patient limited by pain    [] Patient limited by other medical complications  [] Other:     Return to Play: (if applicable)   []  Stage 1: Intro to Strength   []  Stage 2: Return to Run and Strength   []  Stage 3: Return to Jump and Strength   []  Stage 4: Dynamic Strength and Agility   []  Stage 5: Sport Specific Training     []  Ready to Return to Play, Meets All Above Stages   []  Not Ready for Return to Sports   Comments:                         PLAN: See eval  [x] Continue per plan of care [] Alter current plan (see comments above)  [] Plan of care initiated [] Hold pending MD visit [] Discharge    Electronically signed by:  Kitty Benral PTA [id number]     Note: If patient does not return for scheduled/ recommended follow up visits, this note will serve as a discharge from care along with most recent update on progress.

## 2020-10-19 ENCOUNTER — HOSPITAL ENCOUNTER (OUTPATIENT)
Dept: PHYSICAL THERAPY | Age: 50
Setting detail: THERAPIES SERIES
Discharge: HOME | End: 2020-10-19
Payer: COMMERCIAL

## 2020-10-19 ENCOUNTER — TELEPHONE (OUTPATIENT)
Dept: ORTHOPEDIC SURGERY | Age: 50
End: 2020-10-19

## 2020-10-19 PROCEDURE — 97140 MANUAL THERAPY 1/> REGIONS: CPT

## 2020-10-19 PROCEDURE — 97110 THERAPEUTIC EXERCISES: CPT

## 2020-10-19 PROCEDURE — 97112 NEUROMUSCULAR REEDUCATION: CPT

## 2020-10-19 NOTE — TELEPHONE ENCOUNTER
GAVE A NO RECORDS FOR DATE REQUESTED STATEMENT  43/22/4078 TO PRESENT TO Ryley Gutierrez @ [employer] @ [phone]

## 2020-10-19 NOTE — FLOWSHEET NOTE
[facility], [facility]    Physical Therapy Treatment Note/ Progress Report:     Date:  10/19/2020    Patient Name:  Deanne Archibald    :  1970  MRN: [id number]  Restrictions/Precautions:    Medical/Treatment Diagnosis Information:  · Diagnosis: R ankle ORIF (Trimalleolar fx) 20  · Treatment Diagnosis: O18.893G  Insurance/Certification information:  PT Insurance Information: [employer]  Physician Information:  Referring Practitioner: Keith Durand  Has the plan of care been signed (Y/N):        [x]  Yes  []  No      Date of Patient follow up with Physician: 2020    Is this a Progress Report:     []  Yes  [x]  No      If Yes:  Date Range for reporting period:  Initial Eval: 2020  Beginnin2020 --- Endin2020    Progress report will be due (10 Rx or 30 days whichever is less): For MD on      Recertification will be due (POC Duration  / 90 days whichever is less): 2020      Visit # Insurance Allowable Auth Required   In Person  12 20 []  Yes     []  No    Tele Health 0  []  Yes     []  No    Total 12       Functional Scale: LEFS: 100% (Score: 0/80)   Date assessed: 2020      Latex Allergy:  [x]NO      []YES  Preferred Language for Healthcare:   [x]English       [x]other:    Pain level:  1- 2/10 but mostly 1/10  Occupation:  at [employer]. Stands/walks  70% of the day on concrete or thin carpet. Wants to return 11/3/20    SUBJECTIVE:   10/219/20  Reports she is out of the brace for about half of her day. Discussed walking more at home. 5 min of continuous walking up to 10 min in the next wk   10/15/20  Reports she is starting to feel better with driving   reports she feels mostly stiff and apprehensive when she is walking. Reports she did drive herself today - again apprehensive. Starting 10/15/20  Th, she will wean ou of the brace. 20 has not been using her ankle brace, still using her boot.   Discussed the need to make the change. She is in agreement. OBJECTIVE:    Observation:    Test measurements:        RESTRICTIONS/PRECAUTIONS:   9/21/250  Assessment and plan: I did nice discussion with the patient today. At this point we will have her start to walk in the boot as tolerated. I would like her to do that for the next week or so and then wean into Brittany brace. She will walk in a Brittany brace for 2 weeks and then wean out of that. I would like to see her back in 6 weeks time. We will have her continue therapy in the meantime to progressively work on her motion. This should help her motion should prove significantly when she is able to do weightbearing stretch. She is weightbearing as tolerated      Exercises/Interventions:   Surgery 8/4/20  Therapeutic Ex (23802)  Therapeutic Activity (44710)  NMR re-education (57866) Sets/Reps Notes/CUES   Bike     NUSTEP S7 L5 X 6' w/brace             Long sitting gastroc / soleus 3x30\" ea    Long sitting HSS 3x30\"    Ankle pumps x20    Ankle iso DF/PF x10 ea    Quad sets 2x10    Glut sets 2x10    Hip/knee flex  2x10 Modified heel slide without dragging heel   SLR flex, ext, abd 3#  3x10    Prone hams curl 6# 3x10              Seated toe crunches x20 ea    Seated LAQ w/adduct rex 6#   3x10    Plantar flx with red- lightly 3x10 Small Pure motion   DF red-lighly 2x10 Small Pure motion   9/30/20-10/6/20  Gait  In llbars- reg, side to side, center line, backwards 5 min HEP   stepups forwd  6 in  2x10 1 hand    stepups backwd  4 in  2x 10 2 hand   Lateral step down w/ left  4 in  2 x10    Fwd dips  4\" 2x10  2/10 discomfort   Heel raise/toe lifts on level- shift wt R 1 in  2x 10 HEP   Blue foam- shifts M/L   A/P x20 each No support.    bosu flat    M/L  A/P 2 x10 each    Cone walk around bars 3 laps    Single stand- 1 finger support 30 sec x3 HEP   Sit back squats 2x10     ankle t bd x 3 2x10  yellow HEP   Single stand x3    BAPS- seated   A/P  CW/CCW 2x10 each GAIT:  -Pregait WB wt shift M/L and F/B 10' total    -amb in // bars with two hands 5' w/brace Close SBA   -amb WBAT with 1 crutches 50' w/brace  close SBA   -stair training with 1 and 2 hand rails 4 stairs x 3 and 1 respectively  With brace on     -curb training with 1 crutches and no crutch with boot on X 3' ea CGA   Heel strike with tape squares 10 laps 10ft                        Manual Intervention ([id number])     PROM / STM/ 1st and 5th ray mobiliz, calc mobiliz, scar massage to non scabbed areas (inst pt in this also)  see below min DF/ PF range only                                                Patient Education  min Provided biomechanics/ergonomics training to reduce stress across injured/healing structures. Weight bearing education. Provided Education in post-operative precautions/contraindications. Therapeutic Exercise and NMR EXR:    15 min  [x] (00833) Provided verbal/tactile cueing for activities related to strengthening, flexibility, endurance, ROM for improvements in LE, proximal hip, and core control with self care, mobility, lifting, ambulation. [x] (79499) Provided verbal/tactile cueing for activities related to improving balance, coordination, kinesthetic sense, posture, motor skill, proprioception to assist with LE, proximal hip, and core control in self-care, mobility, lifting, ambulation and eccentric single leg control. NMR and Therapeutic Activities:     15 min  [x] (11860 or 68438) Provided verbal/tactile cueing for activities related to improving balance, coordination, kinesthetic sense, posture, motor skill, proprioception and motor activation to allow for proper function of core, proximal hip and LE with self-care and ADLs and functional mobility.    [x] (07178)         10 min       Gait Re-education- Provided training and instruction to the patient for proper LE, core and proximal hip recruitment and positioning and eccentric body weight control with ambulation re-education including up and down stairs     Home Exercise Program:    [x] (02789) Reviewed/Progressed HEP activities related to strengthening, flexibility, endurance, ROM of core, proximal hip and LE for functional self-care, mobility, lifting and ambulation/stair navigation   [x] (62535) Reviewed/Progressed HEP activities related to improving balance, coordination, kinesthetic sense, posture, motor skill, proprioception of core, proximal hip and LE for self-care, mobility, lifting, and ambulation/stair navigation      Manual Treatments:   15 min     Ankle distraction, sub talar distraction, global moiliz of calc, 1st and 5th rays, grt toe. Post glide of talus, post glide of distal tib/fib jt. PROM / STM / Oscillations-Mobs:  G-I, II, III, IV (PA's, Inf., Post.)  [x] (48486) Provided manual therapy to mobilize LE, proximal hip and/or LS spine soft tissue/joints for the purpose of modulating pain, promoting relaxation, increasing ROM, reducing/eliminating soft tissue swelling/inflammation/restriction, improving soft tissue extensibility and allowing for proper ROM for normal function with self-care, mobility, lifting and ambulation. Modalities:      Charges:  Timed Code Treatment Minutes: 45    Total Treatment Minutes:  45    BWC:  TE TIME:  NMR TIME:  MANUAL TIME:  UNTIMED MINUTES:               [] EVAL (LOW) 08885 (typically 20 minutes face-to-face)  [] EVAL (MOD) 68968 (typically 30 minutes face-to-face)  [] EVAL (HIGH) 61552 (typically 45 minutes face-to-face)  [] RE-EVAL     [x] HA(84946) x 1    [] IONTO  [x] NMR (85003) x  1   [] VASO  [x] Manual (82087) x 1   [x] Other: x 1 gait. [] TA x      [] [employer] Traction (79817)  [] ES(attended) (14356)      [] ES (un) (75554):    ASSESSMENT:  Improving. Wants to return to work in 3 weeks. She will have to work hard at home for strenght, balance, and ROM. Would expect her to do well. Tolerated new ex well today       GOALS:   Short Term Goals: To be achieved in: 2 weeks  1. Other    Prognosis for POC: [x] Good [] Fair  [] Poor    Patient requires continued skilled intervention: [x] Yes  [] No    Treatment/Activity Tolerance:  [x] Patient able to complete treatment with no pain reports only stiffness noted [] Patient limited by fatigue  [] Patient limited by pain    [] Patient limited by other medical complications  [] Other:     Return to Play: (if applicable)   []  Stage 1: Intro to Strength   []  Stage 2: Return to Run and Strength   []  Stage 3: Return to Jump and Strength   []  Stage 4: Dynamic Strength and Agility   []  Stage 5: Sport Specific Training     []  Ready to Return to Play, Meets All Above Stages   []  Not Ready for Return to Sports   Comments:                         PLAN: See eval  [x] Continue per plan of care [] Alter current plan (see comments above)  [] Plan of care initiated [] Hold pending MD visit [] Discharge    Electronically signed by:  Fransisco Helton, PT PTA [id number]     Note: If patient does not return for scheduled/ recommended follow up visits, this note will serve as a discharge from care along with most recent update on progress.

## 2020-10-22 ENCOUNTER — HOSPITAL ENCOUNTER (OUTPATIENT)
Dept: PHYSICAL THERAPY | Age: 50
Setting detail: THERAPIES SERIES
Discharge: HOME | End: 2020-10-22
Payer: COMMERCIAL

## 2020-10-22 PROCEDURE — 97110 THERAPEUTIC EXERCISES: CPT

## 2020-10-22 PROCEDURE — 97140 MANUAL THERAPY 1/> REGIONS: CPT

## 2020-10-22 PROCEDURE — 97116 GAIT TRAINING THERAPY: CPT

## 2020-10-22 NOTE — FLOWSHEET NOTE
[facility]    Physical Therapy Treatment Note/ Progress Report:     Date:  10/22/2020    Patient Name:  Hernando Villarreal    :  1970  MRN: [id number]  Restrictions/Precautions:    Medical/Treatment Diagnosis Information:  · Diagnosis: R ankle ORIF (Trimalleolar fx) 20  · Treatment Diagnosis: W56.373Z  Insurance/Certification information:  PT Insurance Information: BCBS  Physician Information:  Referring Practitioner: Vini Leigh  Has the plan of care been signed (Y/N):        [x]  Yes  []  No      Date of Patient follow up with Physician: 2020    Is this a Progress Report:     []  Yes  [x]  No      If Yes:  Date Range for reporting period:  Initial Eval: 2020  Beginnin2020 --- Endin2020    Progress report will be due (10 Rx or 30 days whichever is less): For MD on Monday 36/3/529     Recertification will be due (POC Duration  / 90 days whichever is less): 2020      Visit # Insurance Allowable Auth Required   In Person  12 20 []  Yes     []  No    Tele Health 0  []  Yes     []  No    Total 12       Functional Scale: LEFS: 100% (Score: 0/80)   Date assessed: 2020      Latex Allergy:  [x]NO      []YES  Preferred Language for Healthcare:   [x]English       [x]other:    Pain level:  1- 2/10 but mostly 1/10  Occupation:  at [employer]. Stands/walks  70% of the day on concrete or thin carpet. Wants to return 11/3/20    SUBJECTIVE:  Pt reports she is not wearing brace and has not had it on all day. 10/219/20  Reports she is out of the brace for about half of her day. Discussed walking more at home. 5 min of continuous walking up to 10 min in the next wk   10/15/20  Reports she is starting to feel better with driving  74 reports she feels mostly stiff and apprehensive when she is walking. Reports she did drive herself today - again apprehensive. Starting 10/15/20  Th, she will wean ou of the brace.   20 has not been using her ankle brace, still using her boot. Discussed the need to make the change. She is in agreement. OBJECTIVE:    Observation:    Test measurements:        RESTRICTIONS/PRECAUTIONS:   9/21/250  Assessment and plan: I did nice discussion with the patient today. At this point we will have her start to walk in the boot as tolerated. I would like her to do that for the next week or so and then wean into Brittany brace. She will walk in a Brittany brace for 2 weeks and then wean out of that. I would like to see her back in 6 weeks time. We will have her continue therapy in the meantime to progressively work on her motion. This should help her motion should prove significantly when she is able to do weightbearing stretch. She is weightbearing as tolerated      Exercises/Interventions:   Surgery 8/4/20  Therapeutic Ex (25847)  Therapeutic Activity (65892)  NMR re-education (07893) Sets/Reps Notes/CUES   Bike     NUSTEP S7 L6 X 6' w/brace             Long sitting gastroc / soleus 3x30\" ea    Long sitting HSS 3x30\"    Ankle pumps x20    Ankle iso DF/PF x10 ea    Quad sets 2x10    Glut sets 2x10    Hip/knee flex  2x10 Modified heel slide without dragging heel   SLR flex, ext, abd 3#  3x10    Prone hams curl 6# 3x10              Seated toe crunches x20 ea    Seated LAQ w/adduct rex 6#   3x10    Plantar flx with red- lightly 3x10 Small Pure motion   DF red-lighly 2x10 Small Pure motion   9/30/20-10/6/20  Gait  In llbars- reg, side to side, center line, backwards 5 min HEP   stepups forwd  6 in  2x10 1 hand    stepups backwd  4 in  2x 10 2 hand   Lateral step down w/ left  4 in  2 x10    Fwd dips  4\" 2x10  2/10 discomfort   Heel raise/toe lifts on level- shift wt R 1 in  2x 10 HEP   Blue foam- shifts M/L   A/P x20 each No support.    bosu flat    M/L  A/P 2 x10 each    Cone walk around bars 3 laps    Single stand- 1 finger support 30 sec x3 HEP   Sit back squats 2x10     ankle t bd x 3 2x10 green HEP   Single stand x3    BAPS- seated   A/P  CW/CCW 2x10 each                 GAIT:  -Pregait WB wt shift M/L and F/B 10' total    -amb in // bars with two hands 5' w/brace Close SBA   -amb WBAT with 1 crutches 50' w/brace  close SBA   -stair training with 1 and 2 hand rails 4 stairs x 3 and 1 respectively       -curb training with 1 crutches and no crutch with boot on X 3' ea CGA   Heel strike with tape squares 10 laps 10ft                        Manual Intervention ([id number])     PROM / STM/ 1st and 5th ray mobiliz, calc mobiliz, scar massage to non scabbed areas (inst pt in this also)  see below min DF/ PF range only                                                Patient Education  min Provided biomechanics/ergonomics training to reduce stress across injured/healing structures. Weight bearing education. Provided Education in post-operative precautions/contraindications. Therapeutic Exercise and NMR EXR:    22 min  [x] (55545) Provided verbal/tactile cueing for activities related to strengthening, flexibility, endurance, ROM for improvements in LE, proximal hip, and core control with self care, mobility, lifting, ambulation. [x] (62543) Provided verbal/tactile cueing for activities related to improving balance, coordination, kinesthetic sense, posture, motor skill, proprioception to assist with LE, proximal hip, and core control in self-care, mobility, lifting, ambulation and eccentric single leg control. NMR and Therapeutic Activities:     15 min  [x] (73968 or 98771) Provided verbal/tactile cueing for activities related to improving balance, coordination, kinesthetic sense, posture, motor skill, proprioception and motor activation to allow for proper function of core, proximal hip and LE with self-care and ADLs and functional mobility.    [x] (42537)         10 min       Gait Re-education- Provided training and instruction to the patient for proper LE, core and proximal hip recruitment and positioning and eccentric body weight control be achieved in: 2 weeks  1. Independent in HEP and progression per patient tolerance, in order to prevent re-injury. []? Progressing: [x]? Met: []? Not Met: []? Adjusted       2. Patient will have a decrease in pain to facilitate improvement in movement, function, and ADLs as indicated by Functional Deficits. []? Progressing: [x]? Met: []? Not Met: []? Adjusted          Long Term Goals: To be achieved in: 12 weeks  1. Disability index score of 20% or less for the LEFS to assist with reaching prior level of function. []? Progressing: []? Met: []? Not Met: []? Adjusted      2. Patient will demonstrate increased AROM to equal the opposite side bilaterally to allow for proper joint functioning as indicated by patients Functional Deficits. [x]? Progressing: []? Met: []? Not Met: []? Adjusted       3. Patient will demonstrate an increase in strength to be within 3lbs on the HHD or match bilaterally to allow for proper functional mobility as indicated by patients Functional Deficits. [x]? Progressing: []? Met: []? Not Met: []? Adjusted       4. Patient will return to all transfers, work activities, and functional activities without increased symptoms or restriction. [x]? Progressing: []? Met: []? Not Met: []? Adjusted       5. Patient will have 0/10 pain with ADL's. [x]? Progressing: []? Met: []? Not Met: []? Adjusted       6. Patient stated goal: Return to work with no limitations w/ no AD.   []? Progressing: []? Met: []? Not Met: []? Adjusted       Overall Progression Towards Functional goals/ Treatment Progress Update:  [x] Patient is progressing as expected towards functional goals listed. [] Progression is slowed due to complexities/Impairments listed. [] Progression has been slowed due to co-morbidities.   [] Plan just implemented, too soon to assess goals progression <30days   [] Goals require adjustment due to lack of progress  [] Patient is not progressing as expected and requires additional follow up with physician  [] Other    Prognosis for POC: [x] Good [] Fair  [] Poor    Patient requires continued skilled intervention: [x] Yes  [] No    Treatment/Activity Tolerance:  [x] Patient able to complete treatment with no pain reports only stiffness noted [] Patient limited by fatigue  [] Patient limited by pain    [] Patient limited by other medical complications  [] Other:     Return to Play: (if applicable)   []  Stage 1: Intro to Strength   []  Stage 2: Return to Run and Strength   []  Stage 3: Return to Jump and Strength   []  Stage 4: Dynamic Strength and Agility   []  Stage 5: Sport Specific Training     []  Ready to Return to Play, Meets All Above Stages   []  Not Ready for Return to Sports   Comments:                         PLAN: See eval  [x] Continue per plan of care [] Alter current plan (see comments above)  [] Plan of care initiated [] Hold pending MD visit [] Discharge    Electronically signed by:  Walker Fothergill PTA [id number]     Note: If patient does not return for scheduled/ recommended follow up visits, this note will serve as a discharge from care along with most recent update on progress.

## 2020-11-02 ENCOUNTER — OFFICE VISIT (OUTPATIENT)
Dept: ORTHOPEDIC SURGERY | Age: 50
End: 2020-11-02

## 2020-11-02 VITALS — BODY MASS INDEX: 25.69 KG/M2 | HEIGHT: 63 IN | WEIGHT: 145 LBS

## 2020-11-02 PROCEDURE — 99024 POSTOP FOLLOW-UP VISIT: CPT | Performed by: ORTHOPAEDIC SURGERY

## 2020-11-02 NOTE — PROGRESS NOTES
Subjective: Patient is here for 3-month post-op after reduction internal fixation right ankle fracture. She is doing well but still having some residual stiffness. She says she is continue get better slowly but surely. Objective: Physical exam shows her incisions are well-healed, she is neurovascular intact. She has some mild residual swelling on exam today. Motion is good without instability but she does have some limited dorsiflexion to about 5 degrees. Imaging: AP lateral oblique weightbearing right ankle shows continued interval healing without evidence of complication status post a reduction and fixation of right ankle fracture. Assessment and plan: 53-year-old female is 3-month status post a reduction fixation of the right ankle. She is doing well. Her ankle is healed and there is no evidence of early arthritis or instability. We will give her heel cord stretches to help her get past this lasted bit of residual tightness. We will give her a note to return back to work. Would like her to wait for another month or 2 until she does higher impact type activities but she is fine to go back to jogging elliptical and cycling. She may follow-up on a as needed basis.

## 2020-11-17 ENCOUNTER — TELEPHONE (OUTPATIENT)
Dept: ORTHOPEDIC SURGERY | Age: 50
End: 2020-11-17

## 2023-01-05 NOTE — TELEPHONE ENCOUNTER
I called the patient back and spoke with the  and he was instructed to have her ice, elevate and try to cut back on the narcotic. I made her follow up appointment. He will call back if needed. 05-Jan-2023

## 2024-11-07 ENCOUNTER — HOSPITAL ENCOUNTER (OUTPATIENT)
Dept: GENERAL RADIOLOGY | Age: 54
Discharge: HOME | End: 2024-11-07
Payer: COMMERCIAL

## 2024-11-07 DIAGNOSIS — R10.12 ABDOMINAL PAIN, LEFT UPPER QUADRANT: ICD-10-CM

## 2024-11-07 PROCEDURE — 74018 RADEX ABDOMEN 1 VIEW: CPT

## (undated) DEVICE — SUTURE ETHLN SZ 3-0 L18IN NONABSORBABLE BLK PS-2 L19MM 3/8 1669H

## (undated) DEVICE — DRAPE 33X23IN INCISE ANTIMICROB IOBAN 2

## (undated) DEVICE — COTTON UNDERCAST PADDING,REGULAR FINISH: Brand: WEBRIL

## (undated) DEVICE — PACK EXTREMITY XR

## (undated) DEVICE — SMALL BONES: Brand: ORTHOLOC 3DI

## (undated) DEVICE — DRILL BIT

## (undated) DEVICE — SUTURE MCRYL SZ 2-0 L27IN ABSRB VLT L26MM UR-6 5/8 CIR Y605H

## (undated) DEVICE — GLOVE SURG SZ 7 L12IN FNGR THK94MIL STD WHT ISOLEX LTX FREE

## (undated) DEVICE — ROYAL SILK SURGICAL GOWN, XL: Brand: CONVERTORS

## (undated) DEVICE — DRAPE C ARM W41XL125IN UNIV W CLP AND BND FOR FULL SZ C ARM

## (undated) DEVICE — STERILE LATEX POWDER-FREE SURGICAL GLOVESWITH NITRILE COATING: Brand: PROTEXIS

## (undated) DEVICE — Z CONVERTED USE 2273232 BANDAGE COMPR W6INXL11YD E KNIT DBL SELF CLSR EZE-BAND

## (undated) DEVICE — PADDING CAST N ADH 12X6 IN CRIMPED FINISH 100% COTTON WBRLII

## (undated) DEVICE — IMPLANTABLE DEVICE
Type: IMPLANTABLE DEVICE | Site: ANKLE | Status: NON-FUNCTIONAL
Brand: ORTHOLOC
Removed: 2020-08-04

## (undated) DEVICE — SOLUTION,SALINE,IRRGATION,500ML,STRL: Brand: MEDLINE